# Patient Record
Sex: MALE | Race: WHITE | NOT HISPANIC OR LATINO | Employment: FULL TIME | URBAN - METROPOLITAN AREA
[De-identification: names, ages, dates, MRNs, and addresses within clinical notes are randomized per-mention and may not be internally consistent; named-entity substitution may affect disease eponyms.]

---

## 2020-11-04 ENCOUNTER — OFFICE VISIT (OUTPATIENT)
Dept: FAMILY MEDICINE | Facility: CLINIC | Age: 60
End: 2020-11-04
Payer: COMMERCIAL

## 2020-11-04 VITALS
WEIGHT: 217 LBS | HEIGHT: 68 IN | OXYGEN SATURATION: 99 % | SYSTOLIC BLOOD PRESSURE: 150 MMHG | RESPIRATION RATE: 18 BRPM | DIASTOLIC BLOOD PRESSURE: 80 MMHG | TEMPERATURE: 98.5 F | BODY MASS INDEX: 32.89 KG/M2 | HEART RATE: 89 BPM

## 2020-11-04 DIAGNOSIS — I10 HTN, GOAL BELOW 140/90: Primary | ICD-10-CM

## 2020-11-04 LAB
ANION GAP SERPL CALCULATED.3IONS-SCNC: 3 MMOL/L (ref 3–14)
BUN SERPL-MCNC: 21 MG/DL (ref 7–30)
CALCIUM SERPL-MCNC: 9.1 MG/DL (ref 8.5–10.1)
CHLORIDE SERPL-SCNC: 105 MMOL/L (ref 94–109)
CO2 SERPL-SCNC: 31 MMOL/L (ref 20–32)
CREAT SERPL-MCNC: 1.36 MG/DL (ref 0.66–1.25)
GFR SERPL CREATININE-BSD FRML MDRD: 57 ML/MIN/{1.73_M2}
GLUCOSE SERPL-MCNC: 103 MG/DL (ref 70–99)
POTASSIUM SERPL-SCNC: 4.1 MMOL/L (ref 3.4–5.3)
SODIUM SERPL-SCNC: 139 MMOL/L (ref 133–144)

## 2020-11-04 PROCEDURE — 36415 COLL VENOUS BLD VENIPUNCTURE: CPT | Performed by: NURSE PRACTITIONER

## 2020-11-04 PROCEDURE — 99203 OFFICE O/P NEW LOW 30 MIN: CPT | Performed by: NURSE PRACTITIONER

## 2020-11-04 PROCEDURE — 80048 BASIC METABOLIC PNL TOTAL CA: CPT | Performed by: NURSE PRACTITIONER

## 2020-11-04 RX ORDER — LISINOPRIL AND HYDROCHLOROTHIAZIDE 20; 25 MG/1; MG/1
1 TABLET ORAL DAILY
Qty: 90 TABLET | Refills: 1 | Status: SHIPPED | OUTPATIENT
Start: 2020-11-04 | End: 2021-01-06

## 2020-11-04 RX ORDER — AMLODIPINE BESYLATE 10 MG/1
10 TABLET ORAL DAILY
COMMUNITY
Start: 2020-05-21 | End: 2020-11-04

## 2020-11-04 RX ORDER — AMLODIPINE BESYLATE 10 MG/1
10 TABLET ORAL DAILY
Qty: 90 TABLET | Refills: 1 | Status: SHIPPED | OUTPATIENT
Start: 2020-11-04 | End: 2021-03-22

## 2020-11-04 RX ORDER — LISINOPRIL AND HYDROCHLOROTHIAZIDE 20; 25 MG/1; MG/1
1 TABLET ORAL DAILY
COMMUNITY
Start: 2020-05-21 | End: 2020-11-04

## 2020-11-04 ASSESSMENT — PAIN SCALES - GENERAL: PAINLEVEL: NO PAIN (0)

## 2020-11-04 ASSESSMENT — MIFFLIN-ST. JEOR: SCORE: 1778.81

## 2020-11-04 NOTE — PROGRESS NOTES
"Subjective     Josiah Wolf is a 60 year old male who presents to clinic today for the following health issues:    HPI         Hypertension Follow-up      Do you check your blood pressure regularly outside of the clinic? Yes     Are you following a low salt diet? Yes    Are your blood pressures ever more than 140 on the top number (systolic) OR more   than 90 on the bottom number (diastolic), for example 140/90? Yes      How many servings of fruits and vegetables do you eat daily?  4 or more    On average, how many sweetened beverages do you drink each day (Examples: soda, juice, sweet tea, etc.  Do NOT count diet or artificially sweetened beverages)?   0    How many days per week do you exercise enough to make your heart beat faster? 3 or less    How many minutes a day do you exercise enough to make your heart beat faster? 9 or less    How many days per week do you miss taking your medication? Has been out of medication    Didn't take BP medications this morning - out x3 days  No chest pain, shortness of breath, edema, palpitations, headaches    Review of Systems   Constitutional, HEENT, cardiovascular, pulmonary, gi and gu systems are negative, except as otherwise noted.      Objective    BP (!) 150/80   Pulse 89   Temp 98.5  F (36.9  C) (Oral)   Resp 18   Ht 1.727 m (5' 8\")   Wt 98.4 kg (217 lb)   SpO2 99%   BMI 32.99 kg/m    Body mass index is 32.99 kg/m .  Physical Exam   GENERAL: healthy, alert and no distress  RESP: lungs clear to auscultation - no rales, rhonchi or wheezes  CV: regular rate and rhythm, normal S1 S2, no S3 or S4, no murmur, click or rub, no peripheral edema and peripheral pulses strong  PSYCH: mentation appears normal, affect normal/bright    Results for orders placed or performed in visit on 11/04/20   Basic metabolic panel  (Ca, Cl, CO2, Creat, Gluc, K, Na, BUN)     Status: Abnormal   Result Value Ref Range    Sodium 139 133 - 144 mmol/L    Potassium 4.1 3.4 - 5.3 mmol/L    Chloride " "105 94 - 109 mmol/L    Carbon Dioxide 31 20 - 32 mmol/L    Anion Gap 3 3 - 14 mmol/L    Glucose 103 (H) 70 - 99 mg/dL    Urea Nitrogen 21 7 - 30 mg/dL    Creatinine 1.36 (H) 0.66 - 1.25 mg/dL    GFR Estimate 57 (L) >60 mL/min/[1.73_m2]    GFR Estimate If Black 66 >60 mL/min/[1.73_m2]    Calcium 9.1 8.5 - 10.1 mg/dL           Assessment & Plan     HTN, goal below 140/90  Uncontrolled but out of medication x3 days. Refilled.   - amLODIPine (NORVASC) 10 MG tablet; Take 1 tablet (10 mg) by mouth daily  - lisinopril-hydrochlorothiazide (ZESTORETIC) 20-25 MG tablet; Take 1 tablet by mouth daily  - Basic metabolic panel  (Ca, Cl, CO2, Creat, Gluc, K, Na, BUN)     BMI:   Estimated body mass index is 32.99 kg/m  as calculated from the following:    Height as of this encounter: 1.727 m (5' 8\").    Weight as of this encounter: 98.4 kg (217 lb).   Weight management plan: Discussed healthy diet and exercise guidelines         See Patient Instructions    Return in about 6 months (around 5/4/2021).     The benefits, risks and potential side effects were discussed in detail. Black box warnings discussed as relevant. All patient questions were answered. The patient was instructed to follow up immediately if any adverse reactions develop.    Return precautions discussed, including when to seek urgent/emergent care.    Patient verbalizes understanding and agrees with plan of care. Patient stable for discharge.      STEPHANIE Rsamussen [facility]    This visit took place during the COVID 19 global pandemic.   PPE worn during the visit included: surgical mask and face shield by me and mask by patient       "

## 2020-11-04 NOTE — LETTER
November 6, 2020      Josiah Wolf  [address]  [place] [facility] [address]        Dear ,    Your lab results are attached. Your kidney function is a bit decreased. Be sure to stay well hydrated by drinking at least 64 oz water daily and avoid NSAIDs such as ibuprofen, naproxen, Aleve, and Advil. I recommend follow up in 3 months to recheck kidney function with a BMP and urine microalbumin test. Please let us know if you have any questions.     Thank you for allowing us to participate in your care.       STEPHANIE Rasmussen CNP       Results for orders placed or performed in visit on 11/04/20   Basic metabolic panel  (Ca, Cl, CO2, Creat, Gluc, K, Na, BUN)     Status: Abnormal   Result Value Ref Range    Sodium 139 133 - 144 mmol/L    Potassium 4.1 3.4 - 5.3 mmol/L    Chloride 105 94 - 109 mmol/L    Carbon Dioxide 31 20 - 32 mmol/L    Anion Gap 3 3 - 14 mmol/L    Glucose 103 (H) 70 - 99 mg/dL    Urea Nitrogen 21 7 - 30 mg/dL    Creatinine 1.36 (H) 0.66 - 1.25 mg/dL    GFR Estimate 57 (L) >60 mL/min/[1.73_m2]    GFR Estimate If Black 66 >60 mL/min/[1.73_m2]    Calcium 9.1 8.5 - 10.1 mg/dL

## 2020-11-06 NOTE — RESULT ENCOUNTER NOTE
Please send letter:    Josiah,  Your lab results are attached. Your kidney function is a bit decreased. Be sure to stay well hydrated by drinking at least 64 oz water daily and avoid NSAIDs such as ibuprofen, naproxen, Aleve, and Advil. I recommend follow up in 3 months to recheck kidney function with a BMP and urine microalbumin test. Please let us know if you have any questions.  Thank you for allowing us to participate in your care.  STEPHANIE Rasmussen CNP

## 2020-12-13 ENCOUNTER — TRANSFERRED RECORDS (OUTPATIENT)
Dept: HEALTH INFORMATION MANAGEMENT | Facility: CLINIC | Age: 60
End: 2020-12-13

## 2020-12-13 ENCOUNTER — NURSE TRIAGE (OUTPATIENT)
Dept: NURSING | Facility: CLINIC | Age: 60
End: 2020-12-13

## 2021-01-06 ENCOUNTER — OFFICE VISIT (OUTPATIENT)
Dept: FAMILY MEDICINE | Facility: CLINIC | Age: 61
End: 2021-01-06
Payer: COMMERCIAL

## 2021-01-06 VITALS
SYSTOLIC BLOOD PRESSURE: 148 MMHG | WEIGHT: 208 LBS | OXYGEN SATURATION: 97 % | BODY MASS INDEX: 30.81 KG/M2 | HEART RATE: 100 BPM | DIASTOLIC BLOOD PRESSURE: 81 MMHG | HEIGHT: 69 IN | TEMPERATURE: 98 F

## 2021-01-06 DIAGNOSIS — R73.09 ELEVATED HEMOGLOBIN A1C: ICD-10-CM

## 2021-01-06 DIAGNOSIS — U07.1 PNEUMONIA DUE TO COVID-19 VIRUS: ICD-10-CM

## 2021-01-06 DIAGNOSIS — J12.82 PNEUMONIA DUE TO COVID-19 VIRUS: ICD-10-CM

## 2021-01-06 DIAGNOSIS — J96.01 ACUTE RESPIRATORY FAILURE WITH HYPOXIA (H): ICD-10-CM

## 2021-01-06 DIAGNOSIS — I10 HTN, GOAL BELOW 140/90: ICD-10-CM

## 2021-01-06 DIAGNOSIS — Z09 HOSPITAL DISCHARGE FOLLOW-UP: Primary | ICD-10-CM

## 2021-01-06 PROCEDURE — 99214 OFFICE O/P EST MOD 30 MIN: CPT | Performed by: NURSE PRACTITIONER

## 2021-01-06 RX ORDER — HYDROCHLOROTHIAZIDE 12.5 MG/1
12.5 TABLET ORAL DAILY
Qty: 30 TABLET | Refills: 1 | Status: SHIPPED | OUTPATIENT
Start: 2021-01-06 | End: 2021-01-25

## 2021-01-06 RX ORDER — GLUCOSAMINE HCL/CHONDROITIN SU 500-400 MG
CAPSULE ORAL
Qty: 100 EACH | Refills: 3 | Status: SHIPPED | OUTPATIENT
Start: 2021-01-06 | End: 2021-11-18

## 2021-01-06 RX ORDER — BENZONATATE 200 MG/1
200 CAPSULE ORAL 3 TIMES DAILY PRN
Qty: 30 CAPSULE | Refills: 0 | Status: SHIPPED | OUTPATIENT
Start: 2021-01-06 | End: 2021-11-18

## 2021-01-06 RX ORDER — LANCETS
EACH MISCELLANEOUS
Qty: 100 EACH | Refills: 6 | Status: SHIPPED | OUTPATIENT
Start: 2021-01-06 | End: 2021-11-18

## 2021-01-06 ASSESSMENT — MIFFLIN-ST. JEOR: SCORE: 1749.11

## 2021-01-06 NOTE — PROGRESS NOTES
Assessment & Plan     Hospital discharge follow-up      Acute respiratory failure with hypoxia (H)  Resolved.    Pneumonia due to COVID-19 virus  Mostly resolved but continues with occasional dry cough.  - benzonatate (TESSALON) 200 MG capsule; Take 1 capsule (200 mg) by mouth 3 times daily as needed for cough    Elevated hemoglobin A1c  Monitor BG 3-5 times per week. Recheck in 2 months. Likely from stress of COVID In hospital.   - blood glucose monitoring (NO BRAND SPECIFIED) meter device kit; Use to test blood sugar 1 times daily or as directed. Preferred blood glucose meter OR supplies to accompany: Blood Glucose Monitor Brands: per insurance.  - blood glucose (NO BRAND SPECIFIED) test strip; Use to test blood sugar 1 times daily or as directed. To accompany: Blood Glucose Monitor Brands: per insurance.  - blood glucose calibration (NO BRAND SPECIFIED) solution; To accompany: Blood Glucose Monitor Brands: per insurance.  - thin (NO BRAND SPECIFIED) lancets; Use with lanceting device. To accompany: Blood Glucose Monitor Brands: per insurance.  - alcohol swab prep pads; Use to swab area of injection/jose as directed.    HTN, goal below 140/90  Not at goal with amlodipine alone. Adding diuretic back. He will continue to monitor and notify us if >140/90.  - hydrochlorothiazide (HYDRODIURIL) 12.5 MG tablet; Take 1 tablet (12.5 mg) by mouth daily  - **Basic metabolic panel FUTURE 14d; Future          See Patient Instructions    Return in about 3 months (around 4/6/2021).     The benefits, risks and potential side effects were discussed in detail. Black box warnings discussed as relevant. All patient questions were answered. The patient was instructed to follow up immediately if any adverse reactions develop.    Return precautions discussed, including when to seek urgent/emergent care.    Patient verbalizes understanding and agrees with plan of care. Patient stable for discharge.      STEPHANIE Rasmussen CNP  [facility]    This visit took place during the COVID 19 global pandemic.   PPE worn during the visit included: surgical mask and face shield by me and mask by patient       Subjective     Josiah Wolf is a 60 year old who presents to clinic today for the following health issues     HPI         Hospital Follow-up Visit:    Hospital/Nursing Home/IP Rehab Facility: [facility]  Date of Admission: 12/13/2020  Date of Discharge: 12/21/2020  Reason(s) for Admission: Pneumonia due to COVID-19 virus      Was your hospitalization related to COVID-19? YES   How are you feeling today? fine  In the past 24 hours have you had shortness of breath when speaking, walking, or climbing stairs? I don't have breathing problems fatigue in the morning  Do you have a cough? Yes, I have a cough but it's not worse  When is the last time you had a fever greater than 100? NA  Are you having any other symptoms? None   Do you have any other stressors you would like to discuss with your provider? OTHER: Glucose level       Was the patient in the ICU or did the patient experience delirium during hospitalization?  No    Problems taking medications regularly:  None  Medication changes since discharge: None  Problems adhering to non-medication therapy:  None    Summary of hospitalization:  CareEverywhere information obtained and reviewed  Diagnostic Tests/Treatments reviewed.  Follow up needed: none  Other Healthcare Providers Involved in Patient s Care:         None  Update since discharge: improved. Post Discharge Medication Reconciliation: discharge medications reconciled, continue medications without change.  Plan of care communicated with patient          140/82 this AM  On amlodipine only  Prior to hospitalization was also on lisinopril-hydrochlorothiazide 20-25  hospitalist wanted him to see if he needed other agent after discharge      Review of Systems   Constitutional, HEENT, cardiovascular, pulmonary,  "GI, , musculoskeletal, neuro, skin, endocrine and psych systems are negative, except as otherwise noted.      Objective    BP (!) 148/81   Pulse 100   Temp 98  F (36.7  C)   Ht 1.745 m (5' 8.7\")   Wt 94.3 kg (208 lb)   SpO2 97%   BMI 30.98 kg/m    Body mass index is 30.98 kg/m .  Physical Exam   GENERAL: healthy, alert and no distress  EYES: Eyes grossly normal to inspection, PERRL and conjunctivae and sclerae normal  HENT: ear canals and TM's normal, nose and mouth without ulcers or lesions  NECK: no adenopathy, no asymmetry, masses, or scars and thyroid normal to palpation  RESP: lungs clear to auscultation - no rales, rhonchi or wheezes  CV: regular rate and rhythm, normal S1 S2, no S3 or S4, no murmur, click or rub, no peripheral edema and peripheral pulses strong  MS: no gross musculoskeletal defects noted, no edema  PSYCH: mentation appears normal, affect normal/bright    No results found for any visits on 01/06/21.            "

## 2021-01-06 NOTE — PATIENT INSTRUCTIONS
Benzonatate as needed for cough  Check your blood pressure daily  Check your blood sugar 3 times per week before breakfast  Lab only visit in 2 weeks  Follow up in clinic in 3 months, sooner if frequent urination, dry mouth, very thirsty, etc  Patient Education    At [facility], we strive to deliver an exceptional experience to you, every time we see you. If you receive a survey, please complete it as we do value your feedback.  If you have MyChart, you can expect to receive results automatically within 24 hours of their completion.  Your provider will send a note interpreting your results as well.   If you do not have MyChart, you should receive your results in about a week by mail.    Your care team:                            Family Medicine Internal Medicine   MD Braden Reyna, MD Marva Fowler, MD Wilbur Marshall, MD Aileen Desai PA-C  Leta Miles, APRN CNP    Allan Rodriguez, MD Pediatrics   Low Ellis, PA-C  Geneva Powers, CNP Alondra Vaughn, MD Cathleen Gaston APRN CNP   MD Shavon Daniel MD Deborah Mielke, MD Kim Thein, APRN CNP  Freda Zamudio, PA-C  Minnie Moncada, CNP  MD Tisha Nettles MD Angela Wermerskirchen, MD      Clinic hours: Monday - Thursday 7 am-7 pm; Fridays 7 am-5 pm.   Urgent care: Monday - Friday 11 am-9 pm; Saturday and Sunday 9 am-5 pm.    Clinic: [phone]       [facility]: Monday - Thursday 8 am - 7 pm; Friday 8 am - 6 pm  [facility]: [phone]     Use www.oncare.org for 24/7 diagnosis and treatment of dozens of conditions.      Patient Education     Established High Blood Pressure    High blood pressure (hypertension) is a long-term (chronic) disease. Often healthcare providers don t know what causes it. But it can be caused by certain health conditions and medicines.  If you have high blood pressure, you may not  have any symptoms. If you do have symptoms, they may include:    Headache    Dizziness    Changes in your vision    Chest pain    Shortness of breath  But even without symptoms, high blood pressure that s not treated raises your risk for heart attack, heart failure, kidney disease, and stroke. High blood pressure is a serious health risk and shouldn t be ignored.  Blood pressure measurements are given as 2 numbers. Systolic blood pressure is the upper number. This is the pressure when the heart contracts. Diastolic blood pressure is the lower number. This is the pressure when the heart relaxes between beats. You will see your blood pressure readings written together. For example, a person with a systolic pressure of 118 and a diastolic pressure of 78 will have 118/78 written in the medical record.  Blood pressure is classified as normal, raised (elevated) or stage 1 or stage 2 high blood pressure:    Normal blood pressure. Systolic of less than 120 and diastolic of less than 80 (120/80).    Elevated blood pressure. Systolic of 120 to 129 and diastolic less than 80.    Stage 1 high blood pressure. Systolic is 130 to 139 or diastolic between 80 to 89.    Stage 2 high blood pressure. Systolic is 140 or higher or the diastolic is 90 or higher.  Home care  If you have high blood pressure, follow these home care guidelines to help lower your blood pressure. If you are taking medicines for high blood pressure, these methods may reduce or end your need for medicines in the future.    Start a weight-loss program if you are overweight.    Cut back on how much salt you get in your diet. Here s how to do this:  ? Don t eat foods that have a lot of salt. These include olives, pickles, smoked meats, and salted potato chips.  ? Don t add salt to your food at the table.  ? Use only small amounts of salt when cooking.    Start an exercise program. Talk with your healthcare provider about the type of exercise program that would be  best for you. It doesn't have to be hard. Even brisk walking for 20 minutes 3 times a week is a good form of exercise.    Don t take medicines that stimulate the heart. This includes many over-the-counter cold and sinus decongestant pills and sprays, as well as diet pills. Check the warnings about high blood pressure on the label. Before buying any over-the-counter medicines or supplements, always ask the pharmacist about the product's possible interaction with your high blood pressure and your high blood pressure medicines.    Stimulants such as amphetamine or cocaine could be deadly for someone with high blood pressure. Never take these.    Limit how much caffeine you get in your diet. Switch to caffeine-free products.    Stop smoking. If you are a long-time smoker, this can be hard. Talk with your healthcare provider about medicines and nicotine replacement options to help you. Also join a stop-smoking program . This makes it more likely that you will quit for good.    Learn how to handle stress. This is an important part of any program to lower blood pressure. Learn about relaxation methods such as meditation, yoga, or biofeedback.    If your provider prescribed medicines, take them exactly as directed. Missing doses may cause your blood pressure get out of control.    If you miss a dose, check with your healthcare provider or pharmacist about what to do.    Think about buying an automatic blood pressure machine to check your blood pressure at home. Ask your provider for a recommendation. You can get one of these at most pharmacies.  Using a home blood pressure monitor  The American Heart Association advises the following guidelines for home blood pressure monitoring:    Don't smoke or drink coffee for 30 minutes before taking your blood pressure.    Go to the bathroom before the test.    Relax for 5 minutes before taking the measurement.    Sit with your back supported (don't sit on a couch or soft chair). Keep  your feet on the floor uncrossed. Place your arm on a solid flat surface (such as a table) with the upper part of the arm at heart level. Place the middle of the cuff directly above the bend of the elbow. Check the monitor's instruction manual for an illustration.    Take multiple readings. When you measure, take 2 to 3 readings one minute apart and record all of the results.    Take your blood pressure at the same time every day, or as your healthcare provider advises.    Record the date, time, and blood pressure reading.    Take the record with you to your next healthcare appointment. If your blood pressure monitor has a built-in memory, just take the monitor with you to your next appointment.    Call your provider if you have several high readings. Don't be frightened by one high blood pressure reading. But if you get a few high readings, check in with your healthcare provider.  Follow-up care  You will need to see your healthcare provider regularly. This is to check your blood pressure and to make changes to your medicines. Make a follow-up appointment as directed. Bring the record of your home blood pressure readings to the appointment.  Call 911  [id number] you have any of these:    Blood pressure of 180/120 or higher    Chest pain or shortness of breath    Weakness of an arm or leg or one side of the face    Problems speaking or seeing    When to get medical advice  Call your healthcare provider right away if any of these occur:    Severe headache    Throbbing or rushing sound in the ears    Nosebleed    Sudden severe pain in your belly (abdomen)    Extreme drowsiness, confusion, or fainting    Dizziness or spinning feeling (vertigo)  [employer] last reviewed this educational content on 7/1/2019 2000-2020 [employer]. [address] [place] [address], [place] [address]. All rights reserved. This information is not intended as a substitute for professional medical care. Always follow your healthcare  "professional's instructions.           Patient Education     High Blood Pressure and Chronic Kidney Disease (CKD)  What is high blood pressure?  For CKD patients, a normal blood pressure is 120/80 (or \"120 over 80\"). When blood pressure stays at 130/80 or higher, it is called high blood pressure (hypertension).  How are kidney disease and high blood pressure related?    More than half of the patients who have chronic kidney disease also have high blood pressure.    High blood pressure increases the chance that kidney disease will get worse.    High blood pressure is a major cause of CKD.   Damaged blood vessels send less blood to the important organs in your body, including your kidneys. Your kidneys filter waste from your blood. When your kidneys can't clean your blood as well as they should, this waste builds up in your body.    CKD can also cause high blood pressure. Your kidneys help keep your blood pressure in a healthy range. Controlling your blood pressure will keep your kidneys from getting worse. This will make CKD easier for you and your doctor to manage.  How do I treat high blood pressure and CKD?    Your doctor will give you blood pressure goals to meet and show you how to check your blood pressure at home.  ? It is important that you check your pressure as directed. High blood pressure often has no symptoms.  ? Make sure to write down the date, time and result of your readings.  ? If you take blood pressure medicine, take it before you measure blood pressure. This helps us know if your medicine is working the way it should.    Stop smoking.    Follow your diet and exercise plan.    Take all medicines as directed.  ? If you have kidney disease from diabetes or if you have protein in your urine, the best blood pressure medicines for your treatment are angiotensin converting enzyme (ACE) inhibitors or angiotensin receptor blockers (ARB).  ? If you have any side effects from your blood pressure medicine, " tell your doctor. He or she may switch you to a different medicine.  How often should I see my doctor?    Your CKD team will outline a treatment plan for you after you are diagnosed. Most patients come to the clinic 1 or 2 times per year. We'll ask you to come in more often if:  ? You start a new medicine or we change your medicine dose.  ? Your kidney function is getting worse.  ? Your blood pressure is not controlled.    At each visit, we will test your blood and urine and measure your blood pressure. (Remember to check your blood pressure at home to help guide your treatment.)    DON'T be afraid to ask questions. We are here to help you.  How to check your blood pressure at home:  1. Sit in a quiet area.  2. Remove thick clothing covering the arm.  3. Sit up straight with feet flat on the ground and legs uncrossed.  4. Wrap the cuff around your upper arm above the elbow.  5. Rest the arm you are testing on a steady surface  6. Try to relax for 5 to 10 minutes before testing your blood pressure. This will make the reading more accurate.  7. Record your blood pressure, heart rate, day and time in a blood pressure log.  8. It is best to check your blood pressure around the same time every day--about an hour after taking your medicine.  NOTE: If at any point you are having trouble taking your blood pressure, please call the clinic. One of the nurses will be happy to meet with you. The nurse will review the steps and make sure your blood pressure cuff is working.  Things to avoid when checking blood pressure    Do not check your blood pressure right after waking up in the morning--wait at least an hour.    Do not check your blood pressure after exercise or heavy activity.    Avoid food, caffeine, alcohol and tobacco 30 minutes before testing.    Do not measure your blood pressure with a full bladder.    Avoid talking while checking your blood pressure  For more information  National Kidney Foundation    www.kidney.org  4-691-005-3503  Other resources  National Kidney Foundation   www.kidney.org  0-009-481-9225  For informational purposes only. Not to replace the advice of your health care provider.   Copyright   2016 [employer]. All rights reserved. [employer] 455788 - Rev 02/18.  For informational purposes only. Not to replace the advice of your health care provider.  Copyright   2018 [employer]. All rights reserved.

## 2021-01-20 DIAGNOSIS — I10 HTN, GOAL BELOW 140/90: ICD-10-CM

## 2021-01-20 LAB
ANION GAP SERPL CALCULATED.3IONS-SCNC: 6 MMOL/L (ref 3–14)
BUN SERPL-MCNC: 17 MG/DL (ref 7–30)
CALCIUM SERPL-MCNC: 9 MG/DL (ref 8.5–10.1)
CHLORIDE SERPL-SCNC: 104 MMOL/L (ref 94–109)
CO2 SERPL-SCNC: 30 MMOL/L (ref 20–32)
CREAT SERPL-MCNC: 1.35 MG/DL (ref 0.66–1.25)
GFR SERPL CREATININE-BSD FRML MDRD: 57 ML/MIN/{1.73_M2}
GLUCOSE SERPL-MCNC: 106 MG/DL (ref 70–99)
POTASSIUM SERPL-SCNC: 3.5 MMOL/L (ref 3.4–5.3)
SODIUM SERPL-SCNC: 140 MMOL/L (ref 133–144)

## 2021-01-20 PROCEDURE — 36415 COLL VENOUS BLD VENIPUNCTURE: CPT | Performed by: NURSE PRACTITIONER

## 2021-01-20 PROCEDURE — 80048 BASIC METABOLIC PNL TOTAL CA: CPT | Performed by: NURSE PRACTITIONER

## 2021-01-20 NOTE — LETTER
January 25, 2021      Josiah Wolf  [address]  [place] [facility] [address]        Dear ,    We discussed these results on the phone. Your lab results were at your baseline. Please continue the amlodipine 10 mg and lisinopril 10 mg and follow up in clinic in 2 months. Please let us know if you have any questions.     Thank you for allowing us to participate in your care.       STEPHANIE Rasmussen CNP     Resulted Orders   **Basic metabolic panel FUTURE 14d   Result Value Ref Range    Sodium 140 133 - 144 mmol/L    Potassium 3.5 3.4 - 5.3 mmol/L    Chloride 104 94 - 109 mmol/L    Carbon Dioxide 30 20 - 32 mmol/L    Anion Gap 6 3 - 14 mmol/L    Glucose 106 (H) 70 - 99 mg/dL    Urea Nitrogen 17 7 - 30 mg/dL    Creatinine 1.35 (H) 0.66 - 1.25 mg/dL    GFR Estimate 57 (L) >60 mL/min/[1.73_m2]      Comment:      Non  GFR Calc  Starting 12/18/2018, serum creatinine based estimated GFR (eGFR) will be   calculated using the Chronic Kidney Disease Epidemiology Collaboration   (CKD-EPI) equation.      GFR Estimate If Black 66 >60 mL/min/[1.73_m2]      Comment:       GFR Calc  Starting 12/18/2018, serum creatinine based estimated GFR (eGFR) will be   calculated using the Chronic Kidney Disease Epidemiology Collaboration   (CKD-EPI) equation.      Calcium 9.0 8.5 - 10.1 mg/dL

## 2021-01-25 ENCOUNTER — TELEPHONE (OUTPATIENT)
Dept: FAMILY MEDICINE | Facility: CLINIC | Age: 61
End: 2021-01-25

## 2021-01-25 DIAGNOSIS — I10 HTN, GOAL BELOW 140/90: Primary | ICD-10-CM

## 2021-01-25 RX ORDER — LISINOPRIL 10 MG/1
10 TABLET ORAL DAILY
Qty: 90 TABLET | Refills: 1 | Status: SHIPPED | OUTPATIENT
Start: 2021-01-25 | End: 2021-03-22

## 2021-01-25 NOTE — RESULT ENCOUNTER NOTE
Please send letter:    Mr. Wolf,  We discussed these results on the phone. Your lab results were at your baseline. Please continue the amlodipine 10 mg and lisinopril 10 mg and follow up in clinic in 2 months. Please let us know if you have any questions.  Thank you for allowing us to participate in your care.  STEPHANIE Rasmussen CNP

## 2021-01-25 NOTE — TELEPHONE ENCOUNTER
Discussed labs with patient.   Will add lisinopril back for BP/kidney protection especially in light of recently elevated a1c (thought to be from COVID/dexamethasone in the hospital)  and have him hold hydrochlorothiazide for now. He's still not at pre-hospitalization dosing (lisinopril-hydrochlorothiazide 20-25 + amlodipine 10 mg)  States he's feeling great  BP this /80  New regimen will be amlodipine 10 mg and lisinopril 10 mg  In 2 months at next visit he will need BMP, a1c and urine microalbumin

## 2021-03-22 ENCOUNTER — VIRTUAL VISIT (OUTPATIENT)
Dept: FAMILY MEDICINE | Facility: CLINIC | Age: 61
End: 2021-03-22
Payer: COMMERCIAL

## 2021-03-22 DIAGNOSIS — I10 HTN, GOAL BELOW 140/90: Primary | ICD-10-CM

## 2021-03-22 DIAGNOSIS — R73.09 ELEVATED HEMOGLOBIN A1C: ICD-10-CM

## 2021-03-22 PROCEDURE — 99214 OFFICE O/P EST MOD 30 MIN: CPT | Mod: TEL | Performed by: NURSE PRACTITIONER

## 2021-03-22 RX ORDER — AMLODIPINE BESYLATE 10 MG/1
10 TABLET ORAL DAILY
Qty: 90 TABLET | Refills: 1 | Status: SHIPPED | OUTPATIENT
Start: 2021-03-22 | End: 2021-11-18

## 2021-03-22 RX ORDER — LISINOPRIL 10 MG/1
10 TABLET ORAL DAILY
Qty: 90 TABLET | Refills: 1 | Status: SHIPPED | OUTPATIENT
Start: 2021-03-22 | End: 2021-11-22

## 2021-03-22 NOTE — PATIENT INSTRUCTIONS
Schedule lab only visit (no need to be fasting)  Sign up for [employer] (instructions in this packet) and send me your BP in a couple of weeks  We may need to increase BP medication based on these readings  Patient Education     Controlling High Blood Pressure  High blood pressure (hypertension) is often called the silent killer. This is because many people who have it, don t know it. It can be very dangerous. High blood pressure can raise your risk of heart attack, stroke, heart disease, and heart failure. Controlling your blood pressure can decrease your risk of these problems. It's important to know the appropriate blood pressure range and remember to check your blood pressure regularly. Doing so can save your life.  Blood pressure measurements are given as 2 numbers. Systolic blood pressure is the upper number. This is the pressure when the heart contracts. Diastolic blood pressure is the lower number. This is the pressure when the heart relaxes between beats.  Blood pressure is categorized as normal, elevated, or stage 1 or stage 2 high blood pressure:    Normal blood pressure is systolic of less than 120 and diastolic of less than 80 (120/80)    Elevated blood pressure is systolic of 120 to 129 and diastolic less than 80    Stage 1 high blood pressure is systolic of 130 to 139 or diastolic between 80 to 89    Stage 2 high blood pressure is when systolic is 140 or higher or the diastolic is 90 or higher  A heart-healthy lifestyle can help you control your blood pressure without medicines. Here are some things you can do to pursue a heart-healthy lifestyle:    Choose heart-healthy foods    Select low-salt, low-fat foods. Limit sodium intake to 2,400 mg per day or the amount suggested by your healthcare provider.    Limit canned, dried, cured, packaged, and fast foods. These can contain a lot of salt.    Eat 8 to 10 servings of fruits and vegetables every day.    Choose lean meats, fish, or chicken.    Eat  whole-grain pasta, brown rice, and beans.    Eat 2 to 3 servings of low-fat or fat-free dairy products.    Ask your doctor about the DASH eating plan. This plan helps reduce blood pressure.    When you go to a restaurant, ask that your meal be prepared with no added salt.    Stay at a healthy weight    Ask your healthcare provider how many calories to eat a day. Then stick to that number.    Ask your healthcare provider what weight range is healthiest for you. If you are overweight, a weight loss of only 3% to 5% of your body weight can help lower blood pressure. Generally, a good weight loss goal is to lose 10% of your body weight in a year.    Limit snacks and sweets.    Get regular exercise.    Get up and get active    Find activities you enjoy that can be done alone or with friends or family. Such activities might include bicycling, dancing, walking, or jogging.    Park farther away from building entrances to walk more.    Use stairs instead of the elevator.    When you can, walk or bike instead of driving.    [place] leaves, garden, or do household repairs.    Be active at a moderate to vigorous level of physical activity for at least 40 minutes for a minimum of 3 to 4 days a week.     Manage stress    Make time to relax and enjoy life. Find time to laugh.    Communicate your concerns with your loved ones and your healthcare provider.    Visit with family and friends, and keep up with hobbies.    Limit alcohol and quit smoking    Men should have no more than 2 drinks per day.    Women should have no more than 1 drink per day.    Talk with your healthcare provider about quitting smoking. Smoking significantly increases your risk for heart disease and stroke. Ask your healthcare provider about community smoking cessation programs and other options.    Medicines  If lifestyle changes aren t enough, your healthcare provider may prescribe high blood pressure medicine. Take all medicines as prescribed. If you have any  questions about your medicines, ask your healthcare provider before stopping or changing them.  [employer] last reviewed this educational content on 6/1/2019 2000-2020 [employer]. All rights reserved. This information is not intended as a substitute for professional medical care. Always follow your healthcare professional's instructions.

## 2021-03-22 NOTE — PROGRESS NOTES
Josiah is a 60 year old who is being evaluated via a billable telephone visit.      What phone number would you like to be contacted at? See demographics  How would you like to obtain your AVS? Mail a copy    Assessment & Plan     HTN, goal below 140/90  Was only taking lisinopril. Adding amlodipine back in. Was on hydrochlorothiazide as well but held in the hospital d/t dehydration/COVID symptoms. May need to add back in depending on numbers he sends via [email or web address] in couple of weeks.   - amLODIPine (NORVASC) 10 MG tablet; Take 1 tablet (10 mg) by mouth daily  - lisinopril (ZESTRIL) 10 MG tablet; Take 1 tablet (10 mg) by mouth daily    Elevated hemoglobin A1c  Rechecking with lab only visit.           See Patient Instructions    Schedule lab only visit (no need to be fasting)  Sign up for [email or web address] (instructions in this packet) and send me your BP in a couple of weeks  We may need to increase BP medication based on these readings    Return in about 3 months (around 6/22/2021).     The benefits, risks and potential side effects were discussed in detail. Black box warnings discussed as relevant. All patient questions were answered. The patient was instructed to follow up immediately if any adverse reactions develop.    Return precautions discussed, including when to seek urgent/emergent care.    Patient verbalizes understanding and agrees with plan of care. Patient stable for discharge.      STEPHANIE Rasmussen CNP  [facility]    Oneyda Rahman is a 60 year old who presents for the following health issues     HPI       Pleasant 60 year old male initiated a virtual visit to discuss BP.   BP at home - 165/90  His BP medications were switched around in Dec 2020 due to COVID infection which required hospitalization/ICU stay  Still feeling a little tired and achy at times in joints from COVID  Taking lisinopril 10 mg only (hasn't been taking amlodipine)  No chest pain, shortness of breath, edema,  palpitations, headaches, lightheadedness    Had some high BG in the hospital and a1c elevated. Hospitalist felt it was d/t dexamethasone for COVID treatment.   He has been checking BG at home: -127 - before breakfast  No polyuria, polydipsia or polyphagia    Review of Systems   Constitutional, HEENT, cardiovascular, pulmonary, gi and gu systems are negative, except as otherwise noted.      Objective           Vitals:  No vitals were obtained today due to virtual visit.    Physical Exam   healthy, alert and no distress  PSYCH: Alert and oriented times 3; coherent speech, normal   rate and volume, able to articulate logical thoughts, able   to abstract reason, no tangential thoughts, no hallucinations   or delusions  His affect is normal  RESP: No cough, no audible wheezing, able to talk in full sentences  Remainder of exam unable to be completed due to telephone visits                Phone call duration: 7 minutes

## 2021-11-13 ENCOUNTER — HEALTH MAINTENANCE LETTER (OUTPATIENT)
Age: 61
End: 2021-11-13

## 2021-11-15 NOTE — PROGRESS NOTES
"  Assessment & Plan     HTN, goal below 140/90  Patient reports he's only been taking amlodipine. Prior to having covid Nov 2020 he was also on lisinopril-hydrochlorothiazide but this was stopped in the hospital d/t hypotension/worsening creatinine.   - Albumin Random Urine Quantitative with Creat Ratio  - **Basic metabolic panel FUTURE anytime  - **A1C FUTURE anytime  - amLODIPine (NORVASC) 10 MG tablet; Take 1 tablet (10 mg) by mouth daily  - Adult Nephrology Referral; Future    Elevated hemoglobin A1c  Normalized now. Likely due to steroids from hospitalization for covid.    Screen for colon cancer  - Fecal colorectal cancer screen (FIT); Future  - Fecal colorectal cancer screen (FIT)    Elevated serum creatinine  Referring to nephrology for increasing creatinine/decreasing GFR.   - Adult Nephrology Referral; Future    Chronic kidney disease, stage 3a (H)  Referring to nephrology for increasing creatinine/decreasing GFR.   - Adult Nephrology Referral; Future         BMI:   Estimated body mass index is 33.07 kg/m  as calculated from the following:    Height as of 1/6/21: 1.745 m (5' 8.7\").    Weight as of this encounter: 100.7 kg (222 lb).       See Patient Instructions    Return in about 6 months (around 5/18/2022).     The benefits, risks and potential side effects were discussed in detail. Black box warnings discussed as relevant. All patient questions were answered. The patient was instructed to follow up immediately if any adverse reactions develop.    Return precautions discussed, including when to seek urgent/emergent care.    Patient verbalizes understanding and agrees with plan of care. Patient stable for discharge.        STEPHANIE Rasmussen [facility]    I called his pharmacy. He has only been picking up the amlodipine 10 mg.    Oneyda Rahman is a 61 year old who presents for the following health issues     History of Present Illness       Hypertension: He presents " for follow up of hypertension.  He does check blood pressure  regularly outside of the clinic. Outside blood pressures have been over 140/90. He does not follow a low salt diet.     He eats 0-1 servings of fruits and vegetables daily.He consumes 1 sweetened beverage(s) daily.He exercises with enough effort to increase his heart rate 9 or less minutes per day.  He exercises with enough effort to increase his heart rate 3 or less days per week.   He is taking medications regularly.     Has only been taking 1 BP medication. Isn't sure if it's lisinopril or amlodipine (he is prescribed both)  BP runs 140/78-80 at home  No chest pain, shortness of breath, edema, palpitations.    Baseline creatinine 1.2-1.3. Hasn't seen nephrology. Hospitalized for covid last year and creatinine 1.3-1.4 since then.     Prior to having covid Nov 2020 he was also on lisinopril-hydrochlorothiazide but this was stopped in the hospital d/t hypotension/worsening creatinine. Since then he was restarted on lisinopril but has only been taking amlodipine    Review of Systems   Constitutional, HEENT, cardiovascular, pulmonary, gi and gu systems are negative, except as otherwise noted.      Objective    BP (!) 149/82 (BP Location: Left arm, Patient Position: Sitting, Cuff Size: Adult Large)   Pulse 82   Temp 98.5  F (36.9  C) (Tympanic)   Wt 100.7 kg (222 lb)   SpO2 100%   BMI 33.07 kg/m    Body mass index is 33.07 kg/m .  Physical Exam   GENERAL: healthy, alert and no distress  RESP: lungs clear to auscultation - no rales, rhonchi or wheezes  CV: regular rate and rhythm, normal S1 S2, no S3 or S4, no murmur, click or rub, no peripheral edema and peripheral pulses strong  MS: no gross musculoskeletal defects noted, no edema  PSYCH: mentation appears normal, affect normal/bright    No results found for this or any previous visit (from the past 24 hour(s)).

## 2021-11-15 NOTE — PATIENT INSTRUCTIONS
At [facility], we strive to deliver an exceptional experience to you, every time we see you. If you receive a survey, please complete it as we do value your feedback.  If you have MyChart, you can expect to receive results automatically within 24 hours of their completion.  Your provider will send a note interpreting your results as well.   If you do not have MyChart, you should receive your results in about a week by mail.    Your care team:                            Family Medicine Internal Medicine   MD Braden Reyna MD Shantel Branch-Fleming, MD Srinivasa Vaka, MD Katya Belousova, PADIEGO Miles, APRN CNP    Allan Rodriguez, MD Pediatrics   Low Ellis, PADIEGO Powers, CNP MD Cathleen Velásquez APRN CNP   MD Shavon Daniel MD Deborah Mielke, MD Katiana Snyder, APRN [facility]      Clinic hours: Monday - Thursday 7 am-6 pm; Fridays 7 am-5 pm.   Urgent care: Monday - Friday 10 am- 8 pm; Saturday and Sunday 9 am-5 pm.    Clinic: [phone]       [facility]: Monday - Thursday 8 am - 7 pm; Friday 8 am - 6 pm  [facility]: [phone]     Use [email or web address] for 24/7 diagnosis and treatment of dozens of conditions.

## 2021-11-18 ENCOUNTER — OFFICE VISIT (OUTPATIENT)
Dept: FAMILY MEDICINE | Facility: CLINIC | Age: 61
End: 2021-11-18
Payer: COMMERCIAL

## 2021-11-18 VITALS
DIASTOLIC BLOOD PRESSURE: 82 MMHG | BODY MASS INDEX: 33.07 KG/M2 | TEMPERATURE: 98.5 F | OXYGEN SATURATION: 100 % | SYSTOLIC BLOOD PRESSURE: 149 MMHG | HEART RATE: 82 BPM | WEIGHT: 222 LBS

## 2021-11-18 DIAGNOSIS — R73.09 ELEVATED HEMOGLOBIN A1C: ICD-10-CM

## 2021-11-18 DIAGNOSIS — N18.31 CHRONIC KIDNEY DISEASE, STAGE 3A (H): ICD-10-CM

## 2021-11-18 DIAGNOSIS — R79.89 ELEVATED SERUM CREATININE: ICD-10-CM

## 2021-11-18 DIAGNOSIS — I10 HTN, GOAL BELOW 140/90: Primary | ICD-10-CM

## 2021-11-18 DIAGNOSIS — Z12.11 SCREEN FOR COLON CANCER: ICD-10-CM

## 2021-11-18 LAB
ANION GAP SERPL CALCULATED.3IONS-SCNC: 4 MMOL/L (ref 3–14)
BUN SERPL-MCNC: 17 MG/DL (ref 7–30)
CALCIUM SERPL-MCNC: 9 MG/DL (ref 8.5–10.1)
CHLORIDE BLD-SCNC: 107 MMOL/L (ref 94–109)
CO2 SERPL-SCNC: 28 MMOL/L (ref 20–32)
CREAT SERPL-MCNC: 1.4 MG/DL (ref 0.66–1.25)
CREAT UR-MCNC: 287 MG/DL
GFR SERPL CREATININE-BSD FRML MDRD: 55 ML/MIN/1.73M2
GLUCOSE BLD-MCNC: 125 MG/DL (ref 70–99)
HBA1C MFR BLD: 5.4 % (ref 0–5.6)
MICROALBUMIN UR-MCNC: 295 MG/L
MICROALBUMIN/CREAT UR: 102.79 MG/G CR (ref 0–17)
POTASSIUM BLD-SCNC: 3.7 MMOL/L (ref 3.4–5.3)
SODIUM SERPL-SCNC: 139 MMOL/L (ref 133–144)

## 2021-11-18 PROCEDURE — 82043 UR ALBUMIN QUANTITATIVE: CPT | Performed by: NURSE PRACTITIONER

## 2021-11-18 PROCEDURE — 83036 HEMOGLOBIN GLYCOSYLATED A1C: CPT | Performed by: NURSE PRACTITIONER

## 2021-11-18 PROCEDURE — 99214 OFFICE O/P EST MOD 30 MIN: CPT | Performed by: NURSE PRACTITIONER

## 2021-11-18 PROCEDURE — 80048 BASIC METABOLIC PNL TOTAL CA: CPT | Performed by: NURSE PRACTITIONER

## 2021-11-18 PROCEDURE — 36415 COLL VENOUS BLD VENIPUNCTURE: CPT | Performed by: NURSE PRACTITIONER

## 2021-11-18 RX ORDER — AMLODIPINE BESYLATE 10 MG/1
10 TABLET ORAL DAILY
Qty: 90 TABLET | Refills: 1 | Status: SHIPPED | OUTPATIENT
Start: 2021-11-18 | End: 2022-08-10

## 2021-11-18 RX ORDER — LISINOPRIL 20 MG/1
20 TABLET ORAL DAILY
Status: CANCELLED | OUTPATIENT
Start: 2021-11-18

## 2021-11-22 DIAGNOSIS — R79.89 ELEVATED SERUM CREATININE: ICD-10-CM

## 2021-11-22 DIAGNOSIS — N18.31 CHRONIC KIDNEY DISEASE, STAGE 3A (H): Primary | ICD-10-CM

## 2021-11-24 ENCOUNTER — ANCILLARY PROCEDURE (OUTPATIENT)
Dept: ULTRASOUND IMAGING | Facility: CLINIC | Age: 61
End: 2021-11-24
Attending: NURSE PRACTITIONER
Payer: COMMERCIAL

## 2021-11-24 DIAGNOSIS — N18.31 CHRONIC KIDNEY DISEASE, STAGE 3A (H): ICD-10-CM

## 2021-11-24 DIAGNOSIS — R79.89 ELEVATED SERUM CREATININE: ICD-10-CM

## 2021-11-24 PROCEDURE — 93975 VASCULAR STUDY: CPT | Performed by: RADIOLOGY

## 2021-11-29 DIAGNOSIS — R93.5 ABNORMAL US (ULTRASOUND) OF ABDOMEN: Primary | ICD-10-CM

## 2021-12-01 ENCOUNTER — LAB (OUTPATIENT)
Dept: LAB | Facility: CLINIC | Age: 61
End: 2021-12-01
Payer: COMMERCIAL

## 2021-12-01 DIAGNOSIS — R93.5 ABNORMAL US (ULTRASOUND) OF ABDOMEN: ICD-10-CM

## 2021-12-01 LAB
ALBUMIN SERPL-MCNC: 3.8 G/DL (ref 3.4–5)
ALP SERPL-CCNC: 89 U/L (ref 40–150)
ALT SERPL W P-5'-P-CCNC: 32 U/L (ref 0–70)
AST SERPL W P-5'-P-CCNC: 18 U/L (ref 0–45)
BILIRUB DIRECT SERPL-MCNC: 0.2 MG/DL (ref 0–0.2)
BILIRUB SERPL-MCNC: 0.7 MG/DL (ref 0.2–1.3)
PROT SERPL-MCNC: 7.7 G/DL (ref 6.8–8.8)

## 2021-12-01 PROCEDURE — 82728 ASSAY OF FERRITIN: CPT | Performed by: PATHOLOGY

## 2021-12-01 PROCEDURE — 83550 IRON BINDING TEST: CPT | Performed by: PATHOLOGY

## 2021-12-01 PROCEDURE — 86704 HEP B CORE ANTIBODY TOTAL: CPT | Mod: 90 | Performed by: PATHOLOGY

## 2021-12-01 PROCEDURE — 87340 HEPATITIS B SURFACE AG IA: CPT | Mod: 90 | Performed by: PATHOLOGY

## 2021-12-01 PROCEDURE — 99000 SPECIMEN HANDLING OFFICE-LAB: CPT | Performed by: PATHOLOGY

## 2021-12-01 PROCEDURE — 36415 COLL VENOUS BLD VENIPUNCTURE: CPT | Performed by: PATHOLOGY

## 2021-12-01 PROCEDURE — 80076 HEPATIC FUNCTION PANEL: CPT | Performed by: PATHOLOGY

## 2021-12-03 ENCOUNTER — PRE VISIT (OUTPATIENT)
Dept: GASTROENTEROLOGY | Facility: CLINIC | Age: 61
End: 2021-12-03
Payer: COMMERCIAL

## 2021-12-03 ENCOUNTER — OFFICE VISIT (OUTPATIENT)
Dept: GASTROENTEROLOGY | Facility: CLINIC | Age: 61
End: 2021-12-03
Attending: NURSE PRACTITIONER
Payer: COMMERCIAL

## 2021-12-03 VITALS
HEART RATE: 87 BPM | HEIGHT: 69 IN | OXYGEN SATURATION: 99 % | SYSTOLIC BLOOD PRESSURE: 158 MMHG | TEMPERATURE: 98.3 F | DIASTOLIC BLOOD PRESSURE: 78 MMHG | BODY MASS INDEX: 32.47 KG/M2 | WEIGHT: 219.2 LBS

## 2021-12-03 DIAGNOSIS — E66.811 CLASS 1 OBESITY IN ADULT, UNSPECIFIED BMI, UNSPECIFIED OBESITY TYPE, UNSPECIFIED WHETHER SERIOUS COMORBIDITY PRESENT: Primary | ICD-10-CM

## 2021-12-03 DIAGNOSIS — R93.5 ABNORMAL US (ULTRASOUND) OF ABDOMEN: ICD-10-CM

## 2021-12-03 DIAGNOSIS — I15.9 SECONDARY HYPERTENSION: ICD-10-CM

## 2021-12-03 LAB
FERRITIN SERPL-MCNC: 44 NG/ML (ref 26–388)
HBV CORE AB SERPL QL IA: NONREACTIVE
HBV SURFACE AG SERPL QL IA: NONREACTIVE
IRON SATN MFR SERPL: 18 % (ref 15–46)
IRON SERPL-MCNC: 64 UG/DL (ref 35–180)
TIBC SERPL-MCNC: 358 UG/DL (ref 240–430)

## 2021-12-03 PROCEDURE — G0463 HOSPITAL OUTPT CLINIC VISIT: HCPCS

## 2021-12-03 PROCEDURE — 99204 OFFICE O/P NEW MOD 45 MIN: CPT | Performed by: STUDENT IN AN ORGANIZED HEALTH CARE EDUCATION/TRAINING PROGRAM

## 2021-12-03 ASSESSMENT — PAIN SCALES - GENERAL: PAINLEVEL: NO PAIN (0)

## 2021-12-03 ASSESSMENT — MIFFLIN-ST. JEOR: SCORE: 1794.89

## 2021-12-03 NOTE — PROGRESS NOTES
[facility] New Patient Visit    Date of Visit: December 3, 2021    Reason for referral: abnormal US    Subjective: Mr. Wolf is a 61 year old man with a history of obesity, HTN, who presents for evaluation of an abnormal liver on kidney US    He was first told he had liver disease earlier this fall when he had a renal US that incidentally showed increased hepatic echogenicity. He had LFTs recently that were normal.     No previous known liver disease, abnormal LFTs, imaging tests. No known history of liver decompensation    He drinks alcohol about once a month. No history of heavy use.     He weighs around 210 lbs, generally has been between 190 and 215 the past few years.     Hepatitis C testing negative in the past    ROS: 14 point ROS negative except for positives noted in HPI.    PMHx:  Past Medical History:   Diagnosis Date     Hypertension    No DM  No HLD    PSHx:  Past Surgical History:   Procedure Laterality Date     KNEE SURGERY Right     Mensicus repair       FamHx:  Family History   Problem Relation Age of Onset     Hypertension Mother      Hypertension Father      Cancer Father         Bilary   Mother with non cancerous tumor in kidney    SocHx:  Social History     Socioeconomic History     Marital status:      Spouse name: Not on file     Number of children: Not on file     Years of education: Not on file     Highest education level: Not on file   Occupational History     Not on file   Tobacco Use     Smoking status: Never Smoker     Smokeless tobacco: Never Used   Substance and Sexual Activity     Alcohol use: Yes     Drug use: Never     Sexual activity: Not Currently     Partners: Female   Other Topics Concern     Not on file   Social History Narrative     Not on file     Social Determinants of Health     Financial Resource Strain: Not on file   Food Insecurity: Not on file   Transportation Needs: Not on file   Physical Activity: Not on file   Stress: Not on file  "  Social Connections: Not on file   Intimate Partner Violence: Not on file   Housing Stability: Not on file       Medications:  Current Outpatient Medications   Medication     amLODIPine (NORVASC) 10 MG tablet     No current facility-administered medications for this visit.   MVI  No OTCs, herbals    Allergies:  No Known Allergies    Objective:  BP (!) 158/78   Pulse 87   Temp 98.3  F (36.8  C) (Oral)   Ht 1.745 m (5' 8.7\")   Wt 99.4 kg (219 lb 3.2 oz)   SpO2 99%   BMI 32.65 kg/m    Constitutional: pleasant man in NAD  Eyes: non icteric  Respiratory: Normal respiratory excursion   MSK: normal range of motion of visualized extremities  Abd: Non distended  Skin: No jaundice  Psychiatric: normal mood and orientation    Labs:  Last Comprehensive Metabolic Panel:  Sodium   Date Value Ref Range Status   11/18/2021 139 133 - 144 mmol/L Final   01/20/2021 140 133 - 144 mmol/L Final     Potassium   Date Value Ref Range Status   11/18/2021 3.7 3.4 - 5.3 mmol/L Final   01/20/2021 3.5 3.4 - 5.3 mmol/L Final     Chloride   Date Value Ref Range Status   11/18/2021 107 94 - 109 mmol/L Final   01/20/2021 104 94 - 109 mmol/L Final     Carbon Dioxide   Date Value Ref Range Status   01/20/2021 30 20 - 32 mmol/L Final     Carbon Dioxide (CO2)   Date Value Ref Range Status   11/18/2021 28 20 - 32 mmol/L Final     Anion Gap   Date Value Ref Range Status   11/18/2021 4 3 - 14 mmol/L Final   01/20/2021 6 3 - 14 mmol/L Final     Glucose   Date Value Ref Range Status   11/18/2021 125 (H) 70 - 99 mg/dL Final   01/20/2021 106 (H) 70 - 99 mg/dL Final     Urea Nitrogen   Date Value Ref Range Status   11/18/2021 17 7 - 30 mg/dL Final   01/20/2021 17 7 - 30 mg/dL Final     Creatinine   Date Value Ref Range Status   11/18/2021 1.40 (H) 0.66 - 1.25 mg/dL Final   01/20/2021 1.35 (H) 0.66 - 1.25 mg/dL Final     GFR Estimate   Date Value Ref Range Status   11/18/2021 55 (L) >60 mL/min/1.73m2 Final     Comment:     As of July 11, 2021, eGFR is " calculated by the CKD-EPI creatinine equation, without race adjustment. eGFR can be influenced by muscle mass, exercise, and diet. The reported eGFR is an estimation only and is only applicable if the renal function is stable.   01/20/2021 57 (L) >60 mL/min/[1.73_m2] Final     Comment:     Non  GFR Calc  Starting 12/18/2018, serum creatinine based estimated GFR (eGFR) will be   calculated using the Chronic Kidney Disease Epidemiology Collaboration   (CKD-EPI) equation.       Calcium   Date Value Ref Range Status   11/18/2021 9.0 8.5 - 10.1 mg/dL Final   01/20/2021 9.0 8.5 - 10.1 mg/dL Final     Bilirubin Total   Date Value Ref Range Status   12/01/2021 0.7 0.2 - 1.3 mg/dL Final     Alkaline Phosphatase   Date Value Ref Range Status   12/01/2021 89 40 - 150 U/L Final     ALT   Date Value Ref Range Status   12/01/2021 32 0 - 70 U/L Final     AST   Date Value Ref Range Status   12/01/2021 18 0 - 45 U/L Final     Imaging:    Renal US 11/2021    2. Increased hepatic echogenicity and echotexture incidentally noted.  Differential includes hepatitis, cirrhosis, and hepatosteatosis. If  there is concern for liver pathology, further evaluation with CT or MR  could be considered.    Independently reviewed labs and imaging.     Assessment/Plan: Mr. Wolf is a 61 year old man with a history of obesity, HTN, who presents for evaluation of an abnormal liver on kidney US. LFTs are normal. Suspect this is related to fatty liver disease. No significant alcohol. Hepatitis C testing negative in the past. Recommend work up for other causes of liver disease. Also recommend dedicated Liver US to further characterize liver.     Discussed the natural history of NAFLD, overtime steatosis and inflammation can lead to scarring and cirrhosis. Treatment is 5-10% weight loss with diet and exercise to reduce inflammation and scarring.     Orders Placed This Encounter   Procedures     US Abdomen Limited     Hepatitis B surface  antigen     Hepatitis B core antibody     Iron and iron binding capacity     Ferritin     Alpha 1 Antitrypsin     RTC 12 months.    Josette Thurman MD MS  Hepatology/Liver Transplant  [facility]

## 2021-12-03 NOTE — TELEPHONE ENCOUNTER
RECORDS RECEIVED FROM: Internal   DATE RECEIVED: 12.06.2021   NOTES STATUS DETAILS   OFFICE NOTE from referring provider Internal 11.18.2021 Leta Miles, STEPHANIE CNP   OFFICE NOTE from other specialist  N/A    *Only VASCULITIS or LUPUS gather office notes for the following N/A    *PULMONARY   N/A    *ENT N/A    *DERMATOLOGY N/A    *RHEUMATOLOGY N/A    DISCHARGE SUMMARY from hospital N/A    DISCHARGE REPORT from the ER N/A    MEDICATION LIST Internal / CE    IMAGING  (NEED IMAGES AND REPORTS)     KIDNEY CT SCAN N/A    KIDNEY ULTRASOUND Internal 11.24.2021 US RENAL COMPLETE WITH DUPLEX    MR ABDOMEN N/A    NUCLEAR MEDICINE RENAL N/A    LABS     CBC N/A    CMP N/A    BMP Internal 11.18.2021   UA Care Everywhere 12.22.2016   URINE PROTEIN Care Everywhere 12.22.2016   RENAL PANEL N/A    BIOPSY     KIDNEY BIOPSY  N/A

## 2021-12-03 NOTE — LETTER
12/3/2021     RE: Josiah Wolf  [address]  [facility] [address]    Dear Colleague,    Thank you for referring your patient, Josiah Wolf, to the [facility]PAT[facility]IN[facility] [place]. Please see a copy of my visit note below.    [facility] New Patient Visit    Date of Visit: December 3, 2021    Reason for referral: abnormal US    Subjective: Mr. Wolf is a 61 year old man with a history of obesity, HTN, who presents for evaluation of an abnormal liver on kidney US    He was first told he had liver disease earlier this fall when he had a renal US that incidentally showed increased hepatic echogenicity. He had LFTs recently that were normal.     No previous known liver disease, abnormal LFTs, imaging tests. No known history of liver decompensation    He drinks alcohol about once a month. No history of heavy use.     He weighs around 210 lbs, generally has been between 190 and 215 the past few years.     Hepatitis C testing negative in the past    ROS: 14 point ROS negative except for positives noted in HPI.    PMHx:  Past Medical History:   Diagnosis Date     Hypertension    No DM  No HLD    PSHx:  Past Surgical History:   Procedure Laterality Date     KNEE SURGERY Right     Mensicus repair       FamHx:  Family History   Problem Relation Age of Onset     Hypertension Mother      Hypertension Father      Cancer Father         Bilary   Mother with non cancerous tumor in kidney    SocHx:  Social History     Socioeconomic History     Marital status:      Spouse name: Not on file     Number of children: Not on file     Years of education: Not on file     Highest education level: Not on file   Occupational History     Not on file   Tobacco Use     Smoking status: Never Smoker     Smokeless tobacco: Never Used   Substance and Sexual Activity     Alcohol use: Yes     Drug use: Never     Sexual activity: Not Currently     Partners: Female   Other Topics Concern  "    Not on file   Social History Narrative     Not on file     Social Determinants of Health     Financial Resource Strain: Not on file   Food Insecurity: Not on file   Transportation Needs: Not on file   Physical Activity: Not on file   Stress: Not on file   Social Connections: Not on file   Intimate Partner Violence: Not on file   Housing Stability: Not on file       Medications:  Current Outpatient Medications   Medication     amLODIPine (NORVASC) 10 MG tablet     No current facility-administered medications for this visit.   MVI  No OTCs, herbals    Allergies:  No Known Allergies    Objective:  BP (!) 158/78   Pulse 87   Temp 98.3  F (36.8  C) (Oral)   Ht 1.745 m (5' 8.7\")   Wt 99.4 kg (219 lb 3.2 oz)   SpO2 99%   BMI 32.65 kg/m    Constitutional: pleasant man in NAD  Eyes: non icteric  Respiratory: Normal respiratory excursion   MSK: normal range of motion of visualized extremities  Abd: Non distended  Skin: No jaundice  Psychiatric: normal mood and orientation    Labs:  Last Comprehensive Metabolic Panel:  Sodium   Date Value Ref Range Status   11/18/2021 139 133 - 144 mmol/L Final   01/20/2021 140 133 - 144 mmol/L Final     Potassium   Date Value Ref Range Status   11/18/2021 3.7 3.4 - 5.3 mmol/L Final   01/20/2021 3.5 3.4 - 5.3 mmol/L Final     Chloride   Date Value Ref Range Status   11/18/2021 107 94 - 109 mmol/L Final   01/20/2021 104 94 - 109 mmol/L Final     Carbon Dioxide   Date Value Ref Range Status   01/20/2021 30 20 - 32 mmol/L Final     Carbon Dioxide (CO2)   Date Value Ref Range Status   11/18/2021 28 20 - 32 mmol/L Final     Anion Gap   Date Value Ref Range Status   11/18/2021 4 3 - 14 mmol/L Final   01/20/2021 6 3 - 14 mmol/L Final     Glucose   Date Value Ref Range Status   11/18/2021 125 (H) 70 - 99 mg/dL Final   01/20/2021 106 (H) 70 - 99 mg/dL Final     Urea Nitrogen   Date Value Ref Range Status   11/18/2021 17 7 - 30 mg/dL Final   01/20/2021 17 7 - 30 mg/dL Final     Creatinine "   Date Value Ref Range Status   11/18/2021 1.40 (H) 0.66 - 1.25 mg/dL Final   01/20/2021 1.35 (H) 0.66 - 1.25 mg/dL Final     GFR Estimate   Date Value Ref Range Status   11/18/2021 55 (L) >60 mL/min/1.73m2 Final     Comment:     As of July 11, 2021, eGFR is calculated by the CKD-EPI creatinine equation, without race adjustment. eGFR can be influenced by muscle mass, exercise, and diet. The reported eGFR is an estimation only and is only applicable if the renal function is stable.   01/20/2021 57 (L) >60 mL/min/[1.73_m2] Final     Comment:     Non  GFR Calc  Starting 12/18/2018, serum creatinine based estimated GFR (eGFR) will be   calculated using the Chronic Kidney Disease Epidemiology Collaboration   (CKD-EPI) equation.       Calcium   Date Value Ref Range Status   11/18/2021 9.0 8.5 - 10.1 mg/dL Final   01/20/2021 9.0 8.5 - 10.1 mg/dL Final     Bilirubin Total   Date Value Ref Range Status   12/01/2021 0.7 0.2 - 1.3 mg/dL Final     Alkaline Phosphatase   Date Value Ref Range Status   12/01/2021 89 40 - 150 U/L Final     ALT   Date Value Ref Range Status   12/01/2021 32 0 - 70 U/L Final     AST   Date Value Ref Range Status   12/01/2021 18 0 - 45 U/L Final     Imaging:    Renal US 11/2021    2. Increased hepatic echogenicity and echotexture incidentally noted.  Differential includes hepatitis, cirrhosis, and hepatosteatosis. If  there is concern for liver pathology, further evaluation with CT or MR  could be considered.    Independently reviewed labs and imaging.     Assessment/Plan: Mr. Wolf is a 61 year old man with a history of obesity, HTN, who presents for evaluation of an abnormal liver on kidney US. LFTs are normal. Suspect this is related to fatty liver disease. No significant alcohol. Hepatitis C testing negative in the past. Recommend work up for other causes of liver disease. Also recommend dedicated Liver US to further characterize liver.     Discussed the natural history of NAFLD,  overtime steatosis and inflammation can lead to scarring and cirrhosis. Treatment is 5-10% weight loss with diet and exercise to reduce inflammation and scarring.     Orders Placed This Encounter   Procedures     US Abdomen Limited     Hepatitis B surface antigen     Hepatitis B core antibody     Iron and iron binding capacity     Ferritin     Alpha 1 Antitrypsin     RTC 12 months.    Josette Thurman MD MS  Hepatology/Liver Transplant  [facility]

## 2021-12-03 NOTE — NURSING NOTE
"Chief Complaint   Patient presents with     Consult     Abnormal US     BP (!) 158/78   Pulse 87   Temp 98.3  F (36.8  C) (Oral)   Ht 1.745 m (5' 8.7\")   Wt 99.4 kg (219 lb 3.2 oz)   SpO2 99%   BMI 32.65 kg/m       Miya Lundberg MA  "

## 2021-12-03 NOTE — LETTER
12/3/2021     RE: Josiah Wolf  [address]  [place] [address]    [facility] Liver Clinic New Patient Visit    Date of Visit: December 3, 2021    Reason for referral: abnormal US    Subjective: Mr. Wolf is a 61 year old man with a history of obesity, HTN, who presents for evaluation of an abnormal liver on kidney US    He was first told he had liver disease earlier this fall when he had a renal US that incidentally showed increased hepatic echogenicity. He had LFTs recently that were normal.     No previous known liver disease, abnormal LFTs, imaging tests. No known history of liver decompensation    He drinks alcohol about once a month. No history of heavy use.     He weighs around 210 lbs, generally has been between 190 and 215 the past few years.     Hepatitis C testing negative in the past    ROS: 14 point ROS negative except for positives noted in HPI.    PMHx:  Past Medical History:   Diagnosis Date     Hypertension    No DM  No HLD    PSHx:  Past Surgical History:   Procedure Laterality Date     KNEE SURGERY Right     Mensicus repair       FamHx:  Family History   Problem Relation Age of Onset     Hypertension Mother      Hypertension Father      Cancer Father         Bilary   Mother with non cancerous tumor in kidney    SocHx:  Social History     Socioeconomic History     Marital status:      Spouse name: Not on file     Number of children: Not on file     Years of education: Not on file     Highest education level: Not on file   Occupational History     Not on file   Tobacco Use     Smoking status: Never Smoker     Smokeless tobacco: Never Used   Substance and Sexual Activity     Alcohol use: Yes     Drug use: Never     Sexual activity: Not Currently     Partners: Female   Other Topics Concern     Not on file   Social History Narrative     Not on file     Social Determinants of Health     Financial Resource Strain: Not on file   Food Insecurity: Not on file  "  Transportation Needs: Not on file   Physical Activity: Not on file   Stress: Not on file   Social Connections: Not on file   Intimate Partner Violence: Not on file   Housing Stability: Not on file       Medications:  Current Outpatient Medications   Medication     amLODIPine (NORVASC) 10 MG tablet     No current facility-administered medications for this visit.   MVI  No OTCs, herbals    Allergies:  No Known Allergies    Objective:  BP (!) 158/78   Pulse 87   Temp 98.3  F (36.8  C) (Oral)   Ht 1.745 m (5' 8.7\")   Wt 99.4 kg (219 lb 3.2 oz)   SpO2 99%   BMI 32.65 kg/m    Constitutional: pleasant man in NAD  Eyes: non icteric  Respiratory: Normal respiratory excursion   MSK: normal range of motion of visualized extremities  Abd: Non distended  Skin: No jaundice  Psychiatric: normal mood and orientation    Labs:  Last Comprehensive Metabolic Panel:  Sodium   Date Value Ref Range Status   11/18/2021 139 133 - 144 mmol/L Final   01/20/2021 140 133 - 144 mmol/L Final     Potassium   Date Value Ref Range Status   11/18/2021 3.7 3.4 - 5.3 mmol/L Final   01/20/2021 3.5 3.4 - 5.3 mmol/L Final     Chloride   Date Value Ref Range Status   11/18/2021 107 94 - 109 mmol/L Final   01/20/2021 104 94 - 109 mmol/L Final     Carbon Dioxide   Date Value Ref Range Status   01/20/2021 30 20 - 32 mmol/L Final     Carbon Dioxide (CO2)   Date Value Ref Range Status   11/18/2021 28 20 - 32 mmol/L Final     Anion Gap   Date Value Ref Range Status   11/18/2021 4 3 - 14 mmol/L Final   01/20/2021 6 3 - 14 mmol/L Final     Glucose   Date Value Ref Range Status   11/18/2021 125 (H) 70 - 99 mg/dL Final   01/20/2021 106 (H) 70 - 99 mg/dL Final     Urea Nitrogen   Date Value Ref Range Status   11/18/2021 17 7 - 30 mg/dL Final   01/20/2021 17 7 - 30 mg/dL Final     Creatinine   Date Value Ref Range Status   11/18/2021 1.40 (H) 0.66 - 1.25 mg/dL Final   01/20/2021 1.35 (H) 0.66 - 1.25 mg/dL Final     GFR Estimate   Date Value Ref Range Status "   11/18/2021 55 (L) >60 mL/min/1.73m2 Final     Comment:     As of July 11, 2021, eGFR is calculated by the CKD-EPI creatinine equation, without race adjustment. eGFR can be influenced by muscle mass, exercise, and diet. The reported eGFR is an estimation only and is only applicable if the renal function is stable.   01/20/2021 57 (L) >60 mL/min/[1.73_m2] Final     Comment:     Non  GFR Calc  Starting 12/18/2018, serum creatinine based estimated GFR (eGFR) will be   calculated using the Chronic Kidney Disease Epidemiology Collaboration   (CKD-EPI) equation.       Calcium   Date Value Ref Range Status   11/18/2021 9.0 8.5 - 10.1 mg/dL Final   01/20/2021 9.0 8.5 - 10.1 mg/dL Final     Bilirubin Total   Date Value Ref Range Status   12/01/2021 0.7 0.2 - 1.3 mg/dL Final     Alkaline Phosphatase   Date Value Ref Range Status   12/01/2021 89 40 - 150 U/L Final     ALT   Date Value Ref Range Status   12/01/2021 32 0 - 70 U/L Final     AST   Date Value Ref Range Status   12/01/2021 18 0 - 45 U/L Final     Imaging:    Renal US 11/2021    2. Increased hepatic echogenicity and echotexture incidentally noted.  Differential includes hepatitis, cirrhosis, and hepatosteatosis. If  there is concern for liver pathology, further evaluation with CT or MR  could be considered.    Independently reviewed labs and imaging.     Assessment/Plan: Mr. Wolf is a 61 year old man with a history of obesity, HTN, who presents for evaluation of an abnormal liver on kidney US. LFTs are normal. Suspect this is related to fatty liver disease. No significant alcohol. Hepatitis C testing negative in the past. Recommend work up for other causes of liver disease. Also recommend dedicated Liver US to further characterize liver.     Discussed the natural history of NAFLD, overtime steatosis and inflammation can lead to scarring and cirrhosis. Treatment is 5-10% weight loss with diet and exercise to reduce inflammation and scarring.      Orders Placed This Encounter   Procedures     US Abdomen Limited     Hepatitis B surface antigen     Hepatitis B core antibody     Iron and iron binding capacity     Ferritin     Alpha 1 Antitrypsin     RTC 12 months.    Josette Thurman MD MS  Hepatology/Liver Transplant  [facility]

## 2021-12-06 ENCOUNTER — VIRTUAL VISIT (OUTPATIENT)
Dept: NEPHROLOGY | Facility: CLINIC | Age: 61
End: 2021-12-06
Attending: NURSE PRACTITIONER
Payer: COMMERCIAL

## 2021-12-06 ENCOUNTER — PRE VISIT (OUTPATIENT)
Dept: NEPHROLOGY | Facility: CLINIC | Age: 61
End: 2021-12-06
Payer: COMMERCIAL

## 2021-12-06 DIAGNOSIS — I10 HTN, GOAL BELOW 140/90: ICD-10-CM

## 2021-12-06 DIAGNOSIS — N18.31 CHRONIC KIDNEY DISEASE, STAGE 3A (H): ICD-10-CM

## 2021-12-06 DIAGNOSIS — R79.89 ELEVATED SERUM CREATININE: ICD-10-CM

## 2021-12-06 NOTE — LETTER
12/6/2021     RE: Josiah Wolf  [address]  [place] [facility] [address]     Dear Colleague,    Thank you for referring your patient, Josiah Wolf, to the [facility] NEPHROLOGY CLINIC [place] [facility]. Please see a copy of my visit note below.    Left voicemail for patient to call back to set up telemedicine visit, will call again before appointment time.  Vivian Harrell CMA on 12/6/2021 at 9:49 AM    No show    Again, thank you for allowing me to participate in the care of your patient.      Sincerely,    Fide Sung MD

## 2021-12-06 NOTE — PROGRESS NOTES
Left voicemail for patient to call back to set up telemedicine visit, will call again before appointment time.  Vivian Harrell CMA on 12/6/2021 at 9:49 AM    No show

## 2022-04-28 ENCOUNTER — OFFICE VISIT (OUTPATIENT)
Dept: FAMILY MEDICINE | Facility: CLINIC | Age: 62
End: 2022-04-28
Payer: COMMERCIAL

## 2022-04-28 VITALS
DIASTOLIC BLOOD PRESSURE: 74 MMHG | HEIGHT: 69 IN | WEIGHT: 218 LBS | TEMPERATURE: 97.9 F | HEART RATE: 78 BPM | OXYGEN SATURATION: 98 % | BODY MASS INDEX: 32.29 KG/M2 | SYSTOLIC BLOOD PRESSURE: 128 MMHG

## 2022-04-28 DIAGNOSIS — M25.562 ACUTE PAIN OF LEFT KNEE: ICD-10-CM

## 2022-04-28 DIAGNOSIS — M70.50 PES ANSERINE BURSITIS: Primary | ICD-10-CM

## 2022-04-28 PROBLEM — J96.01 ACUTE RESPIRATORY FAILURE WITH HYPOXIA (H): Status: RESOLVED | Noted: 2021-01-06 | Resolved: 2022-04-28

## 2022-04-28 PROCEDURE — 99213 OFFICE O/P EST LOW 20 MIN: CPT | Performed by: STUDENT IN AN ORGANIZED HEALTH CARE EDUCATION/TRAINING PROGRAM

## 2022-04-28 NOTE — PROGRESS NOTES
Assessment & Plan     Pes anserine bursitis  Discussed etiology of pain and used pictures to describe. Discussed conservative management, ice, rest, PT, voltaren gel vs oral NSAIDs/tylenol. Advised stretching and strengthening of the hamstring and quadriceps.    - Physical Therapy Referral; Future  - diclofenac (VOLTAREN) 1 % topical gel; Apply 4 g topically 4 times daily as needed for moderate pain    Acute pain of left knee  Suspect potential minor meniscus tear based on exam, discussed conservative management initially, will refer for PT. If not improving with 4-8 weeks of PT, consider MRI vs steroid injection vs referral to sports medicine  - Physical Therapy Referral; Future  - diclofenac (VOLTAREN) 1 % topical gel; Apply 4 g topically 4 times daily as needed for moderate pain      Return in about 6 weeks (around 6/9/2022) for Follow up.    [facility] is a 62 year old who presents for the following health issues     History of Present Illness       Reason for visit:  Left knee  Symptom onset:  More than a month  Symptoms include:  Stiffness, pain  Symptom intensity:  Mild  Symptom progression:  Staying the same  Had these symptoms before:  Yes  Has tried/received treatment for these symptoms:  No  What makes it worse:  Steps    He eats 2-3 servings of fruits and vegetables daily.He consumes 0 sweetened beverage(s) daily.He exercises with enough effort to increase his heart rate 9 or less minutes per day.  He exercises with enough effort to increase his heart rate 3 or less days per week.   He is taking medications regularly.       Pain History:  Description  Location: Left knee  Joint Swelling: not currently but at times.   Redness: no  Pain: YES shooting pain, tightness. General ache.  Warmth: no  Intensity:  At its worse 5/10  Progression of Symptoms:  Comes/goes  Accompanying signs and symptoms:   Fevers: no  Numbness/tingling/weakness:  "no  History  Trauma to the area: no  Previous similar problem: YES  Previous evaluation:  YES tear meniscus 2011? Clean up tear.  [facility] malave. unknown location   Precipitating or alleviating factors:  Aggravating factors include: Steps will make is noticeable   Therapies tried and outcome: hot tub             No injury or overuse.   Started about 4 months ago.   Some days notices it daily and other days doesn't notice it.   Worse when getting up from a seated position.   Does feel some edema above the kneecap at times.   Feels shooting pain on medial knee when knee is extended or goes to stand up from seated. Worse with stairs.   No hx of left knee injury or issues in the past.  Hx of right knee meniscus injury.       Hx: Right Meniscal surgery with [place] 2012            Review of Systems   Constitutional, HEENT, cardiovascular, pulmonary, gi and gu systems are negative, except as otherwise noted.      Objective    /74 (BP Location: Right arm, Patient Position: Sitting, Cuff Size: Adult Large)   Pulse 78   Temp 97.9  F (36.6  C) (Oral)   Ht 1.745 m (5' 8.7\")   Wt 98.9 kg (218 lb)   SpO2 98%   BMI 32.47 kg/m    Body mass index is 32.47 kg/m .  Physical Exam   GENERAL: healthy, alert and no distress  NECK: no adenopathy, no asymmetry, masses, or scars and thyroid normal to palpation  RESP: lungs clear to auscultation - no rales, rhonchi or wheezes  CV: regular rate and rhythm, normal S1 S2, no S3 or S4, no murmur, click or rub, no peripheral edema and peripheral pulses strong  MS: no gross musculoskeletal defects noted, trace L knee edema. Full ROM and strength of LE b/l. Negative anterior and posterior drawer test. Varus and valgus stress normal. Mild pain with thessaly's test. Negative mcmurrys and lachmans test. Tenderness to palpation over medial left knee join line and over pes anserine bursa on left.   NEURO: Normal strength and tone, mentation intact and speech normal  PSYCH: mentation " appears normal, affect normal/bright

## 2022-04-28 NOTE — PATIENT INSTRUCTIONS
Start treating with  Physical therapy, ice, relative rest (avoid activities that aggravate your pain), stretching of hamstring and quadriceps muscles, and NSAIDs as needed (trial voltaren gel up to 4 times daily to areas of pain as needed). If not improving after 4-6 weeks of therapy, then return for follow up - consider MRI vs steroid injection at that time

## 2022-05-05 ENCOUNTER — THERAPY VISIT (OUTPATIENT)
Dept: PHYSICAL THERAPY | Facility: CLINIC | Age: 62
End: 2022-05-05
Attending: STUDENT IN AN ORGANIZED HEALTH CARE EDUCATION/TRAINING PROGRAM
Payer: COMMERCIAL

## 2022-05-05 DIAGNOSIS — M25.562 ACUTE PAIN OF LEFT KNEE: ICD-10-CM

## 2022-05-05 DIAGNOSIS — M70.50 PES ANSERINE BURSITIS: ICD-10-CM

## 2022-05-05 PROCEDURE — 97161 PT EVAL LOW COMPLEX 20 MIN: CPT | Mod: GP | Performed by: PHYSICAL THERAPIST

## 2022-05-05 PROCEDURE — 97110 THERAPEUTIC EXERCISES: CPT | Mod: GP | Performed by: PHYSICAL THERAPIST

## 2022-05-05 PROCEDURE — 97112 NEUROMUSCULAR REEDUCATION: CPT | Mod: GP | Performed by: PHYSICAL THERAPIST

## 2022-05-05 ASSESSMENT — ACTIVITIES OF DAILY LIVING (ADL)
WEAKNESS: I HAVE THE SYMPTOM BUT IT DOES NOT AFFECT MY ACTIVITY
STAND: NOT ANSWERED
KNEEL ON THE FRONT OF YOUR KNEE: NOT ANSWERED
SWELLING: I DO NOT HAVE THE SYMPTOM
STIFFNESS: THE SYMPTOM AFFECTS MY ACTIVITY SLIGHTLY
HOW_WOULD_YOU_RATE_THE_OVERALL_FUNCTION_OF_YOUR_KNEE_DURING_YOUR_USUAL_DAILY_ACTIVITIES?: NEARLY NORMAL
GO UP STAIRS: ACTIVITY IS SOMEWHAT DIFFICULT
PAIN: THE SYMPTOM AFFECTS MY ACTIVITY SLIGHTLY
KNEE_ACTIVITY_OF_DAILY_LIVING_SUM: 49
WALK: ACTIVITY IS NOT DIFFICULT
RISE FROM A CHAIR: ACTIVITY IS MINIMALLY DIFFICULT
SQUAT: ACTIVITY IS NOT DIFFICULT
HOW_WOULD_YOU_RATE_THE_CURRENT_FUNCTION_OF_YOUR_KNEE_DURING_YOUR_USUAL_DAILY_ACTIVITIES_ON_A_SCALE_FROM_0_TO_100_WITH_100_BEING_YOUR_LEVEL_OF_KNEE_FUNCTION_PRIOR_TO_YOUR_INJURY_AND_0_BEING_THE_INABILITY_TO_PERFORM_ANY_OF_YOUR_USUAL_DAILY_ACTIVITIES?: 90
GIVING WAY, BUCKLING OR SHIFTING OF KNEE: I DO NOT HAVE THE SYMPTOM
LIMPING: I HAVE THE SYMPTOM BUT IT DOES NOT AFFECT MY ACTIVITY
AS_A_RESULT_OF_YOUR_KNEE_INJURY,_HOW_WOULD_YOU_RATE_YOUR_CURRENT_LEVEL_OF_DAILY_ACTIVITY?: NEARLY NORMAL
GO DOWN STAIRS: ACTIVITY IS MINIMALLY DIFFICULT
SIT WITH YOUR KNEE BENT: ACTIVITY IS MINIMALLY DIFFICULT

## 2022-05-05 NOTE — PROGRESS NOTES
Physical Therapy Initial Evaluation  Subjective:    Patient Health History  Josiah Wolf being seen for Left knee .     Problem began: 4/28/2022 (MD visit ).   Problem occurred: Started several months prior pt noticed increased pain, noted with stairs and longer distance walking   Pain is reported as 0/10 (6/10 worst) on pain scale.  General health as reported by patient is good.  Pertinent medical history includes: high blood pressure.        Surgeries include:  Orthopedic surgery. Other surgery history details: Preious R knee meniscus repair x10+ years ago.        Current occupation is  at a car [employer].   Primary job tasks include:  Computer work, repetitive tasks and prolonged standing.                  Therapist Generated HPI Evaluation         Type of problem:  Left knee.    This is a chronic condition.  Condition occurred with:  Insidious onset.  Where condition occurred: for unknown reasons.  Patient reports pain:  Medial and anterior.  Pain is described as aching and sharp (Occasional sharp pain on medial side) and is intermittent.  Pain radiates to:  Knee. Pain is the same all the time (activity dependent).  Since onset symptoms are unchanged.  Associated symptoms:  Loss of motion/stiffness and loss of strength. Symptoms are exacerbated by activity, ascending stairs, descending stairs and sitting (sitting too long knee feels stiff )  and relieved by NSAID's and heat (Prescription pain medication ).  Imaging testing: none yet.    Restrictions due to condition include:  Working in normal job without restrictions.  Barriers include:  Stairs.                        Objective:  Standing Alignment:              Knee:  Genu varus R and genu varus L    General deviations alignment: Typical gait, per pt with significant pain can result in limping   Gait:    Gait Type:  Normal   Assistive Devices:  None      Flexibility/Screens:       Lower Extremity:  Decreased left lower extremity  flexibility:Quadriceps; Hamstrings and Gastroc    Decreased right lower extremity flexibility:  Quadriceps; Hamstrings and Gastroc                                                      Knee Evaluation:  ROM:  AROM: normal        Pain: Pain with controlled knee bend and emphasis on descent   Endfeel: Firm   Strength:     Extension:  Left: 5/5   Pain:      Right: 5/5   Pain:  Flexion:  Left: 5/5   Pain:      Right: 5/5   Pain:    Quad Set Left:  Good    Pain: +/-   Quad Set Right:  Good    Pain: -  Ligament Testing:  Normal                Special Tests: Special test for knee: Noted clicking with knee AROM.  Left knee positive for the following special tests:  Meniscal    Palpation:    Left knee tenderness present at:  Medial Joint Line    Edema:  Normal      Functional Testing:  : Squats no pain           Proprioception:   [place] Balance Test:  Left:  12s, increased muscle strategy  Right:  30s  % of Uninvolved:           General     ROS    Assessment/Plan:    Patient is a 62 year old male with left side knee complaints.    Patient has the following significant findings with corresponding treatment plan.                Diagnosis 1:  L knee pain      Pain -  manual therapy, self management, education and home program  Decreased strength - therapeutic exercise, therapeutic activities and home program  Impaired balance - neuro re-education, therapeutic activities and home program  Impaired gait - gait training and home program  Decreased function - therapeutic activities and home program    Therapy Evaluation Codes:   Cumulative Therapy Evaluation is: Low complexity.    Previous and current functional limitations:  (See Goal Flow Sheet for this information)    Short term and Long term goals: (See Goal Flow Sheet for this information)     Communication ability:  Patient appears to be able to clearly communicate and understand verbal and written communication and follow directions correctly.  Treatment Explanation - The  following has been discussed with the patient:   RX ordered/plan of care  Anticipated outcomes  Possible risks and side effects  This patient would benefit from PT intervention to resume normal activities.   Rehab potential is good.    Frequency:  1 X week, once daily  Duration:  for 8 weeks  Discharge Plan:  Achieve all LTG.  Independent in home treatment program.  Return to previous functional level by discharge.  Reach maximal therapeutic benefit.    Please refer to the daily flowsheet for treatment today, total treatment time and time spent performing 1:1 timed codes.

## 2022-08-10 ENCOUNTER — OFFICE VISIT (OUTPATIENT)
Dept: FAMILY MEDICINE | Facility: CLINIC | Age: 62
End: 2022-08-10
Payer: COMMERCIAL

## 2022-08-10 VITALS
SYSTOLIC BLOOD PRESSURE: 138 MMHG | OXYGEN SATURATION: 98 % | HEART RATE: 82 BPM | WEIGHT: 201 LBS | TEMPERATURE: 97.7 F | DIASTOLIC BLOOD PRESSURE: 68 MMHG | BODY MASS INDEX: 29.94 KG/M2

## 2022-08-10 DIAGNOSIS — R73.9 HYPERGLYCEMIA: ICD-10-CM

## 2022-08-10 DIAGNOSIS — N18.31 CHRONIC KIDNEY DISEASE, STAGE 3A (H): Primary | ICD-10-CM

## 2022-08-10 DIAGNOSIS — Z13.220 SCREENING FOR HYPERLIPIDEMIA: ICD-10-CM

## 2022-08-10 DIAGNOSIS — I10 HYPERTENSION GOAL BP (BLOOD PRESSURE) < 140/90: ICD-10-CM

## 2022-08-10 DIAGNOSIS — I10 HTN, GOAL BELOW 140/90: ICD-10-CM

## 2022-08-10 DIAGNOSIS — Z12.11 SCREEN FOR COLON CANCER: ICD-10-CM

## 2022-08-10 LAB
ANION GAP SERPL CALCULATED.3IONS-SCNC: 6 MMOL/L (ref 3–14)
BUN SERPL-MCNC: 17 MG/DL (ref 7–30)
CALCIUM SERPL-MCNC: 9.2 MG/DL (ref 8.5–10.1)
CHLORIDE BLD-SCNC: 106 MMOL/L (ref 94–109)
CHOLEST SERPL-MCNC: 162 MG/DL
CO2 SERPL-SCNC: 26 MMOL/L (ref 20–32)
CREAT SERPL-MCNC: 1.35 MG/DL (ref 0.66–1.25)
FASTING STATUS PATIENT QL REPORTED: NO
GFR SERPL CREATININE-BSD FRML MDRD: 60 ML/MIN/1.73M2
GLUCOSE BLD-MCNC: 110 MG/DL (ref 70–99)
HBA1C MFR BLD: 5.6 % (ref 0–5.6)
HDLC SERPL-MCNC: 47 MG/DL
HGB BLD-MCNC: 12.8 G/DL (ref 13.3–17.7)
LDLC SERPL CALC-MCNC: 93 MG/DL
NONHDLC SERPL-MCNC: 115 MG/DL
POTASSIUM BLD-SCNC: 3.9 MMOL/L (ref 3.4–5.3)
SODIUM SERPL-SCNC: 138 MMOL/L (ref 133–144)
TRIGL SERPL-MCNC: 109 MG/DL

## 2022-08-10 PROCEDURE — 80061 LIPID PANEL: CPT | Performed by: FAMILY MEDICINE

## 2022-08-10 PROCEDURE — 83036 HEMOGLOBIN GLYCOSYLATED A1C: CPT | Performed by: FAMILY MEDICINE

## 2022-08-10 PROCEDURE — 80048 BASIC METABOLIC PNL TOTAL CA: CPT | Performed by: FAMILY MEDICINE

## 2022-08-10 PROCEDURE — 85018 HEMOGLOBIN: CPT | Performed by: FAMILY MEDICINE

## 2022-08-10 PROCEDURE — 36415 COLL VENOUS BLD VENIPUNCTURE: CPT | Performed by: FAMILY MEDICINE

## 2022-08-10 PROCEDURE — 99213 OFFICE O/P EST LOW 20 MIN: CPT | Performed by: FAMILY MEDICINE

## 2022-08-10 RX ORDER — AMLODIPINE BESYLATE 10 MG/1
10 TABLET ORAL DAILY
Qty: 90 TABLET | Refills: 3 | Status: SHIPPED | OUTPATIENT
Start: 2022-08-10 | End: 2023-07-24

## 2022-08-10 NOTE — PROGRESS NOTES
Assessment & Plan     Hypertension goal BP (blood pressure) < 140/90  Stable   - Basic metabolic panel  (Ca, Cl, CO2, Creat, Gluc, K, Na, BUN); Future  - Lipid panel reflex to direct LDL Fasting; Future  - Basic metabolic panel  (Ca, Cl, CO2, Creat, Gluc, K, Na, BUN)    Hyperglycemia  Pending   - Hemoglobin A1c; Future  - Hemoglobin A1c    Chronic kidney disease, stage 3a (H)  Pending labs   - Hemoglobin; Future    Screening for hyperlipidemia  Discussed   - Lipid panel reflex to direct LDL Non-fasting; Future  - Lipid panel reflex to direct LDL Non-fasting    Screen for colon cancer  Advised   - [employer])  Pt declined covid vaccine           Return in about 2 months (around 10/10/2022) for Physical Exam.    Pascale Nathan MD  [facility] WILFRID Rahman is a 62 year old{, presenting for the following health issues:  Refill Request      History of Present Illness       Hypertension: He presents for follow up of hypertension.  He does check blood pressure  regularly outside of the clinic. Outside blood pressures have been over 140/90. He does not follow a low salt diet.         Chronic Kidney Disease Follow-up      Do you take any over the counter pain medicine?: No    Review of Systems   CONSTITUTIONAL: NEGATIVE for fever, chills, change in weight  ENT/MOUTH: NEGATIVE for ear, mouth and throat problems  RESP: NEGATIVE for significant cough or SOB  CV: NEGATIVE for chest pain, palpitations or peripheral edema  GI: NEGATIVE for nausea, abdominal pain, heartburn, or change in bowel habits  PSYCHIATRIC: NEGATIVE for changes in mood or affect  ROS otherwise negative      Objective    /68 (BP Location: Right arm, Patient Position: Chair, Cuff Size: Adult Regular)   Pulse 82   Temp 97.7  F (36.5  C) (Oral)   Wt 91.2 kg (201 lb)   SpO2 98%   BMI 29.94 kg/m    Body mass index is 29.94 kg/m .  Physical Exam   GENERAL: healthy, alert and no distress  NECK: no adenopathy, no  asymmetry, masses, or scars and thyroid normal to palpation  RESP: lungs clear to auscultation - no rales, rhonchi or wheezes  CV: regular rate and rhythm, normal S1 S2, no S3 or S4, no murmur, click or rub, no peripheral edema and peripheral pulses strong  ABDOMEN: soft, nontender, no hepatosplenomegaly, no masses and bowel sounds normal  MS: no gross musculoskeletal defects noted, no edema  SKIN: no suspicious lesions or rashes    Pending labs        .  ..

## 2022-08-25 LAB — NONINV COLON CA DNA+OCC BLD SCRN STL QL: NEGATIVE

## 2022-11-19 ENCOUNTER — HEALTH MAINTENANCE LETTER (OUTPATIENT)
Age: 62
End: 2022-11-19

## 2023-07-05 ENCOUNTER — E-VISIT (OUTPATIENT)
Dept: FAMILY MEDICINE | Facility: CLINIC | Age: 63
End: 2023-07-05
Payer: COMMERCIAL

## 2023-07-05 DIAGNOSIS — R68.89 BLOOD PRESSURE ALTERATION: ICD-10-CM

## 2023-07-05 DIAGNOSIS — M54.2 NECK PAIN: Primary | ICD-10-CM

## 2023-07-05 PROCEDURE — 99207 PR NO CHARGE LOS: CPT | Performed by: NURSE PRACTITIONER

## 2023-07-05 NOTE — PATIENT INSTRUCTIONS
Thank you for choosing us for your care. I think an in-clinic visit would be best next steps based on your symptoms. Please schedule a clinic appointment; you won t be charged for this eVisit.      You can schedule an appointment right here in [facility], or call [phone]

## 2023-07-17 ASSESSMENT — ENCOUNTER SYMPTOMS
ARTHRALGIAS: 0
NERVOUS/ANXIOUS: 0
FEVER: 0
JOINT SWELLING: 0
CHILLS: 0
CONSTIPATION: 0
SHORTNESS OF BREATH: 0
HEARTBURN: 0
ABDOMINAL PAIN: 0
HEADACHES: 0
PARESTHESIAS: 0
EYE PAIN: 0
SORE THROAT: 0
MYALGIAS: 0
HEMATOCHEZIA: 0
FREQUENCY: 0
NAUSEA: 0
COUGH: 0
DIZZINESS: 0
DYSURIA: 0
HEMATURIA: 0
PALPITATIONS: 0
DIARRHEA: 0
WEAKNESS: 0

## 2023-07-24 ENCOUNTER — LAB (OUTPATIENT)
Dept: LAB | Facility: CLINIC | Age: 63
End: 2023-07-24
Payer: COMMERCIAL

## 2023-07-24 ENCOUNTER — ANCILLARY PROCEDURE (OUTPATIENT)
Dept: GENERAL RADIOLOGY | Facility: CLINIC | Age: 63
End: 2023-07-24
Attending: FAMILY MEDICINE
Payer: COMMERCIAL

## 2023-07-24 ENCOUNTER — OFFICE VISIT (OUTPATIENT)
Dept: FAMILY MEDICINE | Facility: CLINIC | Age: 63
End: 2023-07-24
Payer: COMMERCIAL

## 2023-07-24 VITALS
WEIGHT: 205 LBS | SYSTOLIC BLOOD PRESSURE: 132 MMHG | BODY MASS INDEX: 30.36 KG/M2 | HEART RATE: 78 BPM | TEMPERATURE: 97.7 F | RESPIRATION RATE: 14 BRPM | HEIGHT: 69 IN | OXYGEN SATURATION: 98 % | DIASTOLIC BLOOD PRESSURE: 70 MMHG

## 2023-07-24 DIAGNOSIS — G89.29 CHRONIC PAIN OF RIGHT KNEE: ICD-10-CM

## 2023-07-24 DIAGNOSIS — I10 HTN, GOAL BELOW 140/90: ICD-10-CM

## 2023-07-24 DIAGNOSIS — Z13.220 SCREENING FOR HYPERLIPIDEMIA: ICD-10-CM

## 2023-07-24 DIAGNOSIS — M25.561 CHRONIC PAIN OF RIGHT KNEE: ICD-10-CM

## 2023-07-24 DIAGNOSIS — Z12.5 SCREENING FOR PROSTATE CANCER: ICD-10-CM

## 2023-07-24 DIAGNOSIS — N18.31 CHRONIC KIDNEY DISEASE, STAGE 3A (H): ICD-10-CM

## 2023-07-24 DIAGNOSIS — R97.20 ELEVATED PROSTATE SPECIFIC ANTIGEN (PSA): ICD-10-CM

## 2023-07-24 DIAGNOSIS — Z00.00 ROUTINE GENERAL MEDICAL EXAMINATION AT A HEALTH CARE FACILITY: Primary | ICD-10-CM

## 2023-07-24 DIAGNOSIS — R73.09 ELEVATED HEMOGLOBIN A1C: ICD-10-CM

## 2023-07-24 DIAGNOSIS — Z11.4 SCREENING FOR HIV (HUMAN IMMUNODEFICIENCY VIRUS): ICD-10-CM

## 2023-07-24 LAB
ALBUMIN SERPL BCG-MCNC: 4.6 G/DL (ref 3.5–5.2)
ALBUMIN UR-MCNC: 100 MG/DL
ALP SERPL-CCNC: 81 U/L (ref 40–129)
ALT SERPL W P-5'-P-CCNC: 27 U/L (ref 0–70)
ANION GAP SERPL CALCULATED.3IONS-SCNC: 13 MMOL/L (ref 7–15)
APPEARANCE UR: CLEAR
AST SERPL W P-5'-P-CCNC: 25 U/L (ref 0–45)
BILIRUB SERPL-MCNC: 0.3 MG/DL
BILIRUB UR QL STRIP: NEGATIVE
BUN SERPL-MCNC: 17.5 MG/DL (ref 8–23)
CALCIUM SERPL-MCNC: 9.7 MG/DL (ref 8.8–10.2)
CHLORIDE SERPL-SCNC: 104 MMOL/L (ref 98–107)
CHOLEST SERPL-MCNC: 156 MG/DL
COLOR UR AUTO: YELLOW
CREAT SERPL-MCNC: 1.22 MG/DL (ref 0.67–1.17)
CREAT UR-MCNC: 262.5 MG/DL
DEPRECATED HCO3 PLAS-SCNC: 26 MMOL/L (ref 22–29)
GFR SERPL CREATININE-BSD FRML MDRD: 67 ML/MIN/1.73M2
GLUCOSE SERPL-MCNC: 104 MG/DL (ref 70–99)
GLUCOSE UR STRIP-MCNC: NEGATIVE MG/DL
HBA1C MFR BLD: 5.7 % (ref 0–5.6)
HDLC SERPL-MCNC: 46 MG/DL
HGB BLD-MCNC: 13 G/DL (ref 13.3–17.7)
HGB UR QL STRIP: ABNORMAL
HIV 1+2 AB+HIV1 P24 AG SERPL QL IA: NONREACTIVE
KETONES UR STRIP-MCNC: NEGATIVE MG/DL
LDLC SERPL CALC-MCNC: 71 MG/DL
LEUKOCYTE ESTERASE UR QL STRIP: NEGATIVE
MICROALBUMIN UR-MCNC: 165.6 MG/L
MICROALBUMIN/CREAT UR: 63.09 MG/G CR (ref 0–17)
NITRATE UR QL: NEGATIVE
NONHDLC SERPL-MCNC: 110 MG/DL
PH UR STRIP: 5 [PH] (ref 5–7)
POTASSIUM SERPL-SCNC: 3.5 MMOL/L (ref 3.4–5.3)
PROT SERPL-MCNC: 7.4 G/DL (ref 6.4–8.3)
PSA SERPL DL<=0.01 NG/ML-MCNC: 4.74 NG/ML (ref 0–4.5)
RBC #/AREA URNS AUTO: NORMAL /HPF
SODIUM SERPL-SCNC: 143 MMOL/L (ref 136–145)
SP GR UR STRIP: >=1.03 (ref 1–1.03)
TRIGL SERPL-MCNC: 194 MG/DL
UROBILINOGEN UR STRIP-ACNC: 0.2 E.U./DL
WBC #/AREA URNS AUTO: NORMAL /HPF

## 2023-07-24 PROCEDURE — 85018 HEMOGLOBIN: CPT

## 2023-07-24 PROCEDURE — 83036 HEMOGLOBIN GLYCOSYLATED A1C: CPT

## 2023-07-24 PROCEDURE — G0103 PSA SCREENING: HCPCS

## 2023-07-24 PROCEDURE — 83540 ASSAY OF IRON: CPT

## 2023-07-24 PROCEDURE — 99214 OFFICE O/P EST MOD 30 MIN: CPT | Mod: 25 | Performed by: FAMILY MEDICINE

## 2023-07-24 PROCEDURE — 80053 COMPREHEN METABOLIC PANEL: CPT

## 2023-07-24 PROCEDURE — 81001 URINALYSIS AUTO W/SCOPE: CPT

## 2023-07-24 PROCEDURE — 83550 IRON BINDING TEST: CPT

## 2023-07-24 PROCEDURE — 82728 ASSAY OF FERRITIN: CPT

## 2023-07-24 PROCEDURE — 73562 X-RAY EXAM OF KNEE 3: CPT | Mod: TC | Performed by: RADIOLOGY

## 2023-07-24 PROCEDURE — 82570 ASSAY OF URINE CREATININE: CPT

## 2023-07-24 PROCEDURE — 82043 UR ALBUMIN QUANTITATIVE: CPT

## 2023-07-24 PROCEDURE — 36415 COLL VENOUS BLD VENIPUNCTURE: CPT

## 2023-07-24 PROCEDURE — 99396 PREV VISIT EST AGE 40-64: CPT | Performed by: FAMILY MEDICINE

## 2023-07-24 PROCEDURE — 80061 LIPID PANEL: CPT

## 2023-07-24 PROCEDURE — 87389 HIV-1 AG W/HIV-1&-2 AB AG IA: CPT

## 2023-07-24 RX ORDER — AMLODIPINE BESYLATE 10 MG/1
10 TABLET ORAL DAILY
Qty: 90 TABLET | Refills: 3 | Status: SHIPPED | OUTPATIENT
Start: 2023-07-24 | End: 2024-08-21

## 2023-07-24 ASSESSMENT — ENCOUNTER SYMPTOMS
CONSTIPATION: 0
DIZZINESS: 0
CHILLS: 0
HEMATOCHEZIA: 0
NAUSEA: 0
PARESTHESIAS: 0
HEMATURIA: 0
FREQUENCY: 0
SHORTNESS OF BREATH: 0
NERVOUS/ANXIOUS: 0
SORE THROAT: 0
FEVER: 0
COUGH: 0
JOINT SWELLING: 0
EYE PAIN: 0
HEARTBURN: 0
PALPITATIONS: 0
HEADACHES: 0
MYALGIAS: 0
ARTHRALGIAS: 0
DIARRHEA: 0
ABDOMINAL PAIN: 0
WEAKNESS: 0
DYSURIA: 0

## 2023-07-24 NOTE — PATIENT INSTRUCTIONS
Shingles vaccine  Covid booster     Preventive Health Recommendations  Male Ages 50 - 64    Yearly exam:             See your health care provider every year in order to  o   Review health changes.   o   Discuss preventive care.    o   Review your medicines if your doctor has prescribed any.   Have a cholesterol test every 5 years, or more frequently if you are at risk for high cholesterol/heart disease.   Have a diabetes test (fasting glucose) every three years. If you are at risk for diabetes, you should have this test more often.   Have a colonoscopy at age 50, or have a yearly FIT test (stool test). These exams will check for colon cancer.    Talk with your health care provider about whether or not a prostate cancer screening test (PSA) is right for you.  You should be tested each year for STDs (sexually transmitted diseases), if you re at risk.     Shots: Get a flu shot each year. Get a tetanus shot every 10 years.     Nutrition:  Eat at least 5 servings of fruits and vegetables daily.   Eat whole-grain bread, whole-wheat pasta and brown rice instead of white grains and rice.   Get adequate Calcium and Vitamin D.     Lifestyle  Exercise for at least 150 minutes a week (30 minutes a day, 5 days a week). This will help you control your weight and prevent disease.   Limit alcohol to one drink per day.   No smoking.   Wear sunscreen to prevent skin cancer.   See your dentist every six months for an exam and cleaning.   See your eye doctor every 1 to 2 years.      Preventive Health Recommendations  Male Ages 50 - 64    Yearly exam:             See your health care provider every year in order to  o   Review health changes.   o   Discuss preventive care.    o   Review your medicines if your doctor has prescribed any.   Have a cholesterol test every 5 years, or more frequently if you are at risk for high cholesterol/heart disease.   Have a diabetes test (fasting glucose) every three years. If you are at risk for  diabetes, you should have this test more often.   Have a colonoscopy at age 50, or have a yearly FIT test (stool test). These exams will check for colon cancer.    Talk with your health care provider about whether or not a prostate cancer screening test (PSA) is right for you.  You should be tested each year for STDs (sexually transmitted diseases), if you re at risk.     Shots: Get a flu shot each year. Get a tetanus shot every 10 years.     Nutrition:  Eat at least 5 servings of fruits and vegetables daily.   Eat whole-grain bread, whole-wheat pasta and brown rice instead of white grains and rice.   Get adequate Calcium and Vitamin D.     Lifestyle  Exercise for at least 150 minutes a week (30 minutes a day, 5 days a week). This will help you control your weight and prevent disease.   Limit alcohol to one drink per day.   No smoking.   Wear sunscreen to prevent skin cancer.   See your dentist every six months for an exam and cleaning.   See your eye doctor every 1 to 2 years.

## 2023-07-24 NOTE — PROGRESS NOTES
SUBJECTIVE:   CC: Josiah is an 63 year old who presents for preventative health visit.       7/24/2023     7:25 AM   Additional Questions   Roomed by Consuelo CALDERON MA   Accompanied by Self     Healthy Habits:     Getting at least 3 servings of Calcium per day:  NO    Bi-annual eye exam:  NO    Dental care twice a year:  NO    Sleep apnea or symptoms of sleep apnea:  None    Diet:  Regular (no restrictions)    Frequency of exercise:  2-3 days/week    Duration of exercise:  15-30 minutes    Taking medications regularly:  Yes    Medication side effects:  None    Hx of HTN: on amlodipine 10 mg. Completed BP study through [employer]-was told his BP was different between left and right side.     CKD stage 3A    Right knee-intermittent sharp pain on medial side-will happen with laying, or with first few steps.    Sleep: can fall asleep,  Today's PHQ-2 Score:       7/24/2023     7:22 AM   PHQ-2 ( 1999 [employer])   Q1: Little interest or pleasure in doing things 0   Q2: Feeling down, depressed or hopeless 0   PHQ-2 Score 0   Q1: Little interest or pleasure in doing things Not at all   Q2: Feeling down, depressed or hopeless Not at all   PHQ-2 Score 0     Musculoskeletal problem/pain    Duration: Years  Description  Location: Right knee  Intensity:  moderate  Accompanying signs and symptoms: Pain on the inner part of his knee.  No pain with touching, but with movement  History  Previous similar problem: YES  Previous evaluation:  none  Precipitating or alleviating factors:  Trauma or overuse: no   Aggravating factors include: standing and walking  Therapies tried and outcome: nothing    Have you ever done Advance Care Planning? (For example, a Health Directive, POLST, or a discussion with a medical provider or your loved ones about your wishes): Yes, patient states has an Advance Care Planning document and will bring a copy to the clinic.    Social History     Tobacco Use    Smoking status: Never    Smokeless tobacco: Never   Substance  "Use Topics    Alcohol use: Yes           7/17/2023     8:08 AM   Alcohol Use   Prescreen: >3 drinks/day or >7 drinks/week? No       Last PSA:   Prostate Specific Antigen Screen   Date Value Ref Range Status   07/24/2023 4.74 (H) 0.00 - 4.50 ng/mL Final       Reviewed orders with patient. Reviewed health maintenance and updated orders accordingly - Yes  Lab work is in process    Reviewed and updated as needed this visit by clinical staff   Tobacco  Allergies  Meds   Med Hx  Surg Hx  Fam Hx          Reviewed and updated as needed this visit by Provider       Med Hx  Surg Hx  Fam Hx             Review of Systems   Constitutional:  Negative for chills and fever.   HENT:  Negative for congestion, ear pain, hearing loss and sore throat.    Eyes:  Negative for pain and visual disturbance.   Respiratory:  Negative for cough and shortness of breath.    Cardiovascular:  Negative for chest pain, palpitations and peripheral edema.   Gastrointestinal:  Negative for abdominal pain, constipation, diarrhea, heartburn, hematochezia and nausea.   Genitourinary:  Positive for impotence. Negative for dysuria, frequency, genital sores, hematuria, penile discharge and urgency.   Musculoskeletal:  Negative for arthralgias, joint swelling and myalgias.   Skin:  Negative for rash.   Neurological:  Negative for dizziness, weakness, headaches and paresthesias.   Psychiatric/Behavioral:  Negative for mood changes. The patient is not nervous/anxious.      OBJECTIVE:   /70 (BP Location: Left arm)   Pulse 78   Temp 97.7  F (36.5  C) (Tympanic)   Resp 14   Ht 1.745 m (5' 8.7\")   Wt 93 kg (205 lb)   SpO2 98%   BMI 30.54 kg/m      Physical Exam  Constitutional:       Appearance: Normal appearance.   HENT:      Right Ear: Tympanic membrane normal.      Left Ear: Tympanic membrane normal.   Eyes:      Conjunctiva/sclera: Conjunctivae normal.   Cardiovascular:      Rate and Rhythm: Normal rate and regular rhythm.      Pulses: " Normal pulses.   Pulmonary:      Effort: Pulmonary effort is normal.      Breath sounds: Normal breath sounds.   Abdominal:      General: Bowel sounds are normal.   Musculoskeletal:      Right lower leg: No edema.      Left lower leg: No edema.   Skin:     General: Skin is warm and dry.   Neurological:      Mental Status: He is alert.   Psychiatric:         Thought Content: Thought content normal.         Judgment: Judgment normal.       ASSESSMENT/PLAN:   Josiah was seen today for physical.    Diagnoses and all orders for this visit:    Routine general medical examination at a health care facility    Chronic kidney disease, stage 3a (H)  -     UA Macroscopic with reflex to Microscopic and Culture; Future  -     Albumin Random Urine Quantitative with Creat Ratio; Future  -     Hemoglobin; Future  -     Comprehensive metabolic panel (BMP + Alb, Alk Phos, ALT, AST, Total. Bili, TP); Future  -     Ferritin; Future  -     Iron and iron binding capacity; Future    Screening for hyperlipidemia  -     Lipid panel reflex to direct LDL Non-fasting; Future    HTN, goal below 140/90  -     amLODIPine (NORVASC) 10 MG tablet; Take 1 tablet (10 mg) by mouth daily    Elevated hemoglobin A1c  -     Hemoglobin A1c; Future    Screening for prostate cancer  -     PSA, screen; Future    Chronic pain of right knee  Obtain x-ray today.  Can consider working with sports medicine versus physical therapy in the future  -     XR Knee Right 3 Views; Future    Elevated prostate specific antigen (PSA)  -     Adult Urology  Referral; Future    Other orders  -     REVIEW OF HEALTH MAINTENANCE PROTOCOL ORDERS        Patient has been advised of split billing requirements and indicates understanding: Yes      COUNSELING:   Reviewed preventive health counseling, as reflected in patient instructions  Special attention given to:        Regular exercise       Healthy diet/nutrition       Vision screening       Alcohol Use        Colorectal  cancer screening       Prostate cancer screening        He reports that he has never smoked. He has never used smokeless tobacco.            KELSEA CALHOUN DO  [facility]

## 2023-07-25 LAB
FERRITIN SERPL-MCNC: 97 NG/ML (ref 31–409)
IRON BINDING CAPACITY (ROCHE): 327 UG/DL (ref 240–430)
IRON SATN MFR SERPL: 22 % (ref 15–46)
IRON SERPL-MCNC: 73 UG/DL (ref 61–157)

## 2023-10-17 DIAGNOSIS — I10 HTN, GOAL BELOW 140/90: ICD-10-CM

## 2023-10-17 RX ORDER — AMLODIPINE BESYLATE 10 MG/1
10 TABLET ORAL DAILY
Qty: 90 TABLET | Refills: 3 | OUTPATIENT
Start: 2023-10-17

## 2023-12-13 ENCOUNTER — OFFICE VISIT (OUTPATIENT)
Dept: OPTOMETRY | Facility: CLINIC | Age: 63
End: 2023-12-13
Payer: COMMERCIAL

## 2023-12-13 DIAGNOSIS — H52.4 MYOPIA WITH ASTIGMATISM AND PRESBYOPIA, BILATERAL: Primary | ICD-10-CM

## 2023-12-13 DIAGNOSIS — H52.13 MYOPIA WITH ASTIGMATISM AND PRESBYOPIA, BILATERAL: Primary | ICD-10-CM

## 2023-12-13 DIAGNOSIS — H52.203 MYOPIA WITH ASTIGMATISM AND PRESBYOPIA, BILATERAL: Primary | ICD-10-CM

## 2023-12-13 PROCEDURE — 92004 COMPRE OPH EXAM NEW PT 1/>: CPT | Performed by: OPTOMETRIST

## 2023-12-13 PROCEDURE — 92015 DETERMINE REFRACTIVE STATE: CPT | Performed by: OPTOMETRIST

## 2023-12-13 ASSESSMENT — VISUAL ACUITY
CORRECTION_TYPE: GLASSES
OS_SC: 20/20-2
OD_SC: 20/30
OS_CC+: -1
METHOD: SNELLEN - LINEAR
OS_CC: 20/20
OD_CC+: -1
OD_CC: 20/20
OS_SC: 20/80
OD_SC: 20/70

## 2023-12-13 ASSESSMENT — REFRACTION_WEARINGRX
OS_SPHERE: -2.75
SPECS_TYPE: SVL
OD_AXIS: 171
OS_AXIS: 010
OD_SPHERE: -2.75
OD_CYLINDER: +1.50
OS_CYLINDER: +1.00

## 2023-12-13 ASSESSMENT — CONF VISUAL FIELD
OS_SUPERIOR_NASAL_RESTRICTION: 0
OS_SUPERIOR_TEMPORAL_RESTRICTION: 0
OD_INFERIOR_TEMPORAL_RESTRICTION: 0
OS_NORMAL: 1
OD_NORMAL: 1
OD_INFERIOR_NASAL_RESTRICTION: 0
OS_INFERIOR_TEMPORAL_RESTRICTION: 0
OS_INFERIOR_NASAL_RESTRICTION: 0
OD_SUPERIOR_TEMPORAL_RESTRICTION: 0
METHOD: COUNTING FINGERS
OD_SUPERIOR_NASAL_RESTRICTION: 0

## 2023-12-13 ASSESSMENT — EXTERNAL EXAM - RIGHT EYE: OD_EXAM: NORMAL

## 2023-12-13 ASSESSMENT — REFRACTION_MANIFEST
OD_AXIS: 164
OS_AXIS: 011
OD_AXIS: 159
OD_SPHERE: -1.75
OD_CYLINDER: +1.00
OS_SPHERE: -3.00
OS_SPHERE: -2.75
METHOD_AUTOREFRACTION: 1
OS_CYLINDER: +1.75
OS_CYLINDER: +1.25
OD_SPHERE: -2.25
OD_ADD: +2.50
OS_AXIS: 176
OD_CYLINDER: +1.00

## 2023-12-13 ASSESSMENT — TONOMETRY
OD_IOP_MMHG: 16
IOP_METHOD: APPLANATION
OS_IOP_MMHG: 16

## 2023-12-13 ASSESSMENT — CUP TO DISC RATIO
OD_RATIO: 0.3
OS_RATIO: 0.2

## 2023-12-13 ASSESSMENT — SLIT LAMP EXAM - LIDS
COMMENTS: NORMAL
COMMENTS: NORMAL

## 2023-12-13 ASSESSMENT — EXTERNAL EXAM - LEFT EYE: OS_EXAM: NORMAL

## 2023-12-13 NOTE — PROGRESS NOTES
Chief Complaint   Patient presents with    Annual Eye Exam        Last Eye Exam: 2021  Dilated Previously: Yes, side effects of dilation explained today    What are you currently using to see?  Glasses - SV distance        Distance Vision Acuity: Noticed gradual change in both eyes in glasses     Near Vision Acuity: Satisfied with vision while reading and using computer unaided    Eye Comfort: good  Do you use eye drops? : No      Adrianne Ramirez - Optometric Assistant           Medical, surgical and family histories reviewed and updated 12/13/2023.       OBJECTIVE: See Ophthalmology exam    ASSESSMENT:    ICD-10-CM    1. Myopia with astigmatism and presbyopia, bilateral  H52.13 EYE EXAM (SIMPLE-NONBILLABLE)    H52.203 REFRACTION    H52.4         Minimal change   PLAN:     Velvet Pollard OD

## 2023-12-13 NOTE — LETTER
12/13/2023         RE: Josiah Wolf  [address]  [place] [address]        Dear Colleague,    [facility] Please see a copy of my visit note below.    Chief Complaint   Patient presents with     Annual Eye Exam        Last Eye Exam: 2021  Dilated Previously: Yes, side effects of dilation explained today    What are you currently using to see?  Glasses - SV distance        Distance Vision Acuity: Noticed gradual change in both eyes in glasses     Near Vision Acuity: Satisfied with vision while reading and using computer unaided    Eye Comfort: good  Do you use eye drops? : No      Adrianne Ramirez - Optometric Assistant           Medical, surgical and family histories reviewed and updated 12/13/2023.       OBJECTIVE: See Ophthalmology exam    ASSESSMENT:    ICD-10-CM    1. Myopia with astigmatism and presbyopia, bilateral  H52.13 EYE EXAM (SIMPLE-NONBILLABLE)    H52.203 REFRACTION    H52.4         Minimal change   PLAN:     Velvet Pollard OD     Again, thank you for allowing me to participate in the care of your patient.        Sincerely,        Velvet Pollard, OD

## 2024-05-03 NOTE — TELEPHONE ENCOUNTER
Patient reports that he tested positive for COVID this week.     Reports that his O2 level was 70% - 80% at rest.  Attempted to confirm if was the o2 or heart rate.      Talking in single words.  Able to hear him struggling to breath/breathing heavy.     Patient rechecked O2 and HR.     O2 - 77% HR - 86    Advised to call 9-1-1 per protocol.     Patient asked questions about what hospitals were near him.  Thinking he will be going to ED vs. Calling 9-1-1.     Debby Singer RN/[facility]    COVID 19 Nurse Triage Plan/Patient Instructions    Please be aware that novel coronavirus (COVID-19) may be circulating in the community. If you develop symptoms such as fever, cough, or SOB or if you have concerns about the presence of another infection including coronavirus (COVID-19), please contact your health care provider or visit [email or web address].     Disposition/Instructions    Call to EMS/911 recommended. Follow protocol based instructions.     Bring Your Own Device:  Please also bring your smart device(s) (smart phones, tablets, laptops) and their charging cables for your personal use and to communicate with your care team during your visit.    Thank you for taking steps to prevent the spread of this virus.  o Limit your contact with others.  o Wear a simple mask to cover your cough.  o Wash your hands well and often.    Resources    [facility]: About COVID-19: [email or web address]    CDC: What to Do If You're Sick: www.cdc.gov/coronavirus/2019-ncov/about/steps-when-sick.html    CDC: Ending Home Isolation: www.cdc.gov/coronavirus/2019-ncov/hcp/disposition-in-home-patients.html     CDC: Caring for Someone: www.cdc.gov/coronavirus/2019-ncov/if-you-are-sick/care-for-someone.html     [facility]: Interim Guidance for Hospital Discharge to Home: www.health.[facility].[place]/diseases/coronavirus/hcp/hospdischarge.pdf    [facility] clinical trials (COVID-19 research studies):  clinicalaffairs.[email or web address]-clinical-trials     Below are the COVID-19 hotlines at the [place] [facility] ([facility]). Interpreters are available.   o For health questions: Call 132-550-7384 or 1-933.876.2653 (7 a.m. to 7 p.m.)  o For questions about schools and childcare: Call 344-681-0186 or 1-446.268.1483 (7 a.m. to 7 p.m.)    Reason for Disposition    Severe difficulty breathing (e.g., struggling for each breath, speaks in single words)    Additional Information    Negative: [1] Breathing stopped AND [2] hasn't returned    Negative: Choking on something    Protocols used: BREATHING DIFFICULTY-A-AH       177.8

## 2024-08-18 DIAGNOSIS — I10 HTN, GOAL BELOW 140/90: ICD-10-CM

## 2024-08-21 RX ORDER — AMLODIPINE BESYLATE 10 MG/1
10 TABLET ORAL DAILY
Qty: 30 TABLET | Refills: 1 | Status: SHIPPED | OUTPATIENT
Start: 2024-08-21 | End: 2024-09-11

## 2024-08-25 ENCOUNTER — HEALTH MAINTENANCE LETTER (OUTPATIENT)
Age: 64
End: 2024-08-25

## 2024-09-11 ENCOUNTER — OFFICE VISIT (OUTPATIENT)
Dept: FAMILY MEDICINE | Facility: CLINIC | Age: 64
End: 2024-09-11
Payer: COMMERCIAL

## 2024-09-11 VITALS
DIASTOLIC BLOOD PRESSURE: 80 MMHG | HEART RATE: 81 BPM | RESPIRATION RATE: 18 BRPM | SYSTOLIC BLOOD PRESSURE: 132 MMHG | TEMPERATURE: 97.7 F | HEIGHT: 69 IN | WEIGHT: 213 LBS | BODY MASS INDEX: 31.55 KG/M2 | OXYGEN SATURATION: 98 %

## 2024-09-11 DIAGNOSIS — Z87.19 HISTORY OF FATTY INFILTRATION OF LIVER: ICD-10-CM

## 2024-09-11 DIAGNOSIS — N18.31 CHRONIC KIDNEY DISEASE, STAGE 3A (H): ICD-10-CM

## 2024-09-11 DIAGNOSIS — Z13.220 LIPID SCREENING: ICD-10-CM

## 2024-09-11 DIAGNOSIS — Z87.898 HISTORY OF PREDIABETES: ICD-10-CM

## 2024-09-11 DIAGNOSIS — R39.9 LOWER URINARY TRACT SYMPTOMS: ICD-10-CM

## 2024-09-11 DIAGNOSIS — R97.20 ELEVATED PROSTATE SPECIFIC ANTIGEN (PSA): ICD-10-CM

## 2024-09-11 DIAGNOSIS — D64.9 ANEMIA, UNSPECIFIED TYPE: ICD-10-CM

## 2024-09-11 DIAGNOSIS — I10 HTN, GOAL BELOW 140/90: ICD-10-CM

## 2024-09-11 DIAGNOSIS — Z00.00 ROUTINE GENERAL MEDICAL EXAMINATION AT A HEALTH CARE FACILITY: Primary | ICD-10-CM

## 2024-09-11 LAB
ALBUMIN SERPL BCG-MCNC: 4.7 G/DL (ref 3.5–5.2)
ALBUMIN UR-MCNC: 100 MG/DL
ALP SERPL-CCNC: 77 U/L (ref 40–150)
ALT SERPL W P-5'-P-CCNC: 34 U/L (ref 0–70)
ANION GAP SERPL CALCULATED.3IONS-SCNC: 11 MMOL/L (ref 7–15)
APPEARANCE UR: CLEAR
AST SERPL W P-5'-P-CCNC: 30 U/L (ref 0–45)
BACTERIA #/AREA URNS HPF: ABNORMAL /HPF
BASOPHILS # BLD AUTO: 0 10E3/UL (ref 0–0.2)
BASOPHILS NFR BLD AUTO: 0 %
BILIRUB SERPL-MCNC: 0.6 MG/DL
BILIRUB UR QL STRIP: NEGATIVE
BUN SERPL-MCNC: 18.8 MG/DL (ref 8–23)
CALCIUM SERPL-MCNC: 9.5 MG/DL (ref 8.8–10.4)
CHLORIDE SERPL-SCNC: 101 MMOL/L (ref 98–107)
CHOLEST SERPL-MCNC: 158 MG/DL
COLOR UR AUTO: YELLOW
CREAT SERPL-MCNC: 1.5 MG/DL (ref 0.67–1.17)
CREAT UR-MCNC: 173.6 MG/DL
EGFRCR SERPLBLD CKD-EPI 2021: 52 ML/MIN/1.73M2
EOSINOPHIL # BLD AUTO: 0.1 10E3/UL (ref 0–0.7)
EOSINOPHIL NFR BLD AUTO: 1 %
ERYTHROCYTE [DISTWIDTH] IN BLOOD BY AUTOMATED COUNT: 14.6 % (ref 10–15)
FASTING STATUS PATIENT QL REPORTED: YES
FASTING STATUS PATIENT QL REPORTED: YES
FERRITIN SERPL-MCNC: 61 NG/ML (ref 31–409)
GLUCOSE SERPL-MCNC: 119 MG/DL (ref 70–99)
GLUCOSE UR STRIP-MCNC: NEGATIVE MG/DL
HBA1C MFR BLD: 6 % (ref 0–5.6)
HCO3 SERPL-SCNC: 26 MMOL/L (ref 22–29)
HCT VFR BLD AUTO: 38.3 % (ref 40–53)
HDLC SERPL-MCNC: 48 MG/DL
HGB BLD-MCNC: 12.5 G/DL (ref 13.3–17.7)
HGB UR QL STRIP: NEGATIVE
IMM GRANULOCYTES # BLD: 0 10E3/UL
IMM GRANULOCYTES NFR BLD: 0 %
IRON BINDING CAPACITY (ROCHE): 337 UG/DL (ref 240–430)
IRON SATN MFR SERPL: 28 % (ref 15–46)
IRON SERPL-MCNC: 95 UG/DL (ref 61–157)
KETONES UR STRIP-MCNC: NEGATIVE MG/DL
LDLC SERPL CALC-MCNC: 88 MG/DL
LEUKOCYTE ESTERASE UR QL STRIP: NEGATIVE
LYMPHOCYTES # BLD AUTO: 1 10E3/UL (ref 0.8–5.3)
LYMPHOCYTES NFR BLD AUTO: 13 %
MCH RBC QN AUTO: 26.7 PG (ref 26.5–33)
MCHC RBC AUTO-ENTMCNC: 32.6 G/DL (ref 31.5–36.5)
MCV RBC AUTO: 82 FL (ref 78–100)
MICROALBUMIN UR-MCNC: 226.6 MG/L
MICROALBUMIN/CREAT UR: 130.53 MG/G CR (ref 0–17)
MONOCYTES # BLD AUTO: 0.6 10E3/UL (ref 0–1.3)
MONOCYTES NFR BLD AUTO: 7 %
MUCOUS THREADS #/AREA URNS LPF: PRESENT /LPF
NEUTROPHILS # BLD AUTO: 5.8 10E3/UL (ref 1.6–8.3)
NEUTROPHILS NFR BLD AUTO: 78 %
NITRATE UR QL: NEGATIVE
NONHDLC SERPL-MCNC: 110 MG/DL
PH UR STRIP: 6 [PH] (ref 5–7)
PLATELET # BLD AUTO: 215 10E3/UL (ref 150–450)
POTASSIUM SERPL-SCNC: 4.5 MMOL/L (ref 3.4–5.3)
PROT SERPL-MCNC: 7.7 G/DL (ref 6.4–8.3)
PSA SERPL DL<=0.01 NG/ML-MCNC: 2.02 NG/ML (ref 0–4.5)
RBC # BLD AUTO: 4.69 10E6/UL (ref 4.4–5.9)
RBC #/AREA URNS AUTO: ABNORMAL /HPF
RETICS # AUTO: 0.07 10E6/UL (ref 0.03–0.1)
RETICS/RBC NFR AUTO: 1.5 % (ref 0.5–2)
SODIUM SERPL-SCNC: 138 MMOL/L (ref 135–145)
SP GR UR STRIP: 1.02 (ref 1–1.03)
TRIGL SERPL-MCNC: 111 MG/DL
UROBILINOGEN UR STRIP-ACNC: 0.2 E.U./DL
WBC # BLD AUTO: 7.4 10E3/UL (ref 4–11)
WBC #/AREA URNS AUTO: ABNORMAL /HPF

## 2024-09-11 PROCEDURE — 83036 HEMOGLOBIN GLYCOSYLATED A1C: CPT | Performed by: FAMILY MEDICINE

## 2024-09-11 PROCEDURE — 36415 COLL VENOUS BLD VENIPUNCTURE: CPT | Performed by: FAMILY MEDICINE

## 2024-09-11 PROCEDURE — 83540 ASSAY OF IRON: CPT | Performed by: FAMILY MEDICINE

## 2024-09-11 PROCEDURE — 99396 PREV VISIT EST AGE 40-64: CPT | Performed by: FAMILY MEDICINE

## 2024-09-11 PROCEDURE — 80053 COMPREHEN METABOLIC PANEL: CPT | Performed by: FAMILY MEDICINE

## 2024-09-11 PROCEDURE — 85045 AUTOMATED RETICULOCYTE COUNT: CPT | Performed by: FAMILY MEDICINE

## 2024-09-11 PROCEDURE — 83550 IRON BINDING TEST: CPT | Performed by: FAMILY MEDICINE

## 2024-09-11 PROCEDURE — 82043 UR ALBUMIN QUANTITATIVE: CPT | Performed by: FAMILY MEDICINE

## 2024-09-11 PROCEDURE — 85025 COMPLETE CBC W/AUTO DIFF WBC: CPT | Performed by: FAMILY MEDICINE

## 2024-09-11 PROCEDURE — 99214 OFFICE O/P EST MOD 30 MIN: CPT | Mod: 25 | Performed by: FAMILY MEDICINE

## 2024-09-11 PROCEDURE — 82728 ASSAY OF FERRITIN: CPT | Performed by: FAMILY MEDICINE

## 2024-09-11 PROCEDURE — 80061 LIPID PANEL: CPT | Performed by: FAMILY MEDICINE

## 2024-09-11 PROCEDURE — 81001 URINALYSIS AUTO W/SCOPE: CPT | Performed by: FAMILY MEDICINE

## 2024-09-11 PROCEDURE — 99207 BLOOD MORPHOLOGY PATHOLOGIST REVIEW: CPT | Performed by: PATHOLOGY

## 2024-09-11 PROCEDURE — G0103 PSA SCREENING: HCPCS | Performed by: FAMILY MEDICINE

## 2024-09-11 PROCEDURE — 82570 ASSAY OF URINE CREATININE: CPT | Performed by: FAMILY MEDICINE

## 2024-09-11 RX ORDER — LISINOPRIL 2.5 MG/1
2.5 TABLET ORAL DAILY
Qty: 30 TABLET | Refills: 1 | Status: SHIPPED | OUTPATIENT
Start: 2024-09-11

## 2024-09-11 RX ORDER — AMLODIPINE BESYLATE 10 MG/1
10 TABLET ORAL DAILY
Qty: 90 TABLET | Refills: 3 | Status: SHIPPED | OUTPATIENT
Start: 2024-09-11

## 2024-09-11 RX ORDER — TAMSULOSIN HYDROCHLORIDE 0.4 MG/1
0.4 CAPSULE ORAL DAILY
Qty: 30 CAPSULE | Refills: 1 | Status: SHIPPED | OUTPATIENT
Start: 2024-09-11

## 2024-09-11 RX ORDER — LISINOPRIL 5 MG/1
5 TABLET ORAL DAILY
Qty: 30 TABLET | Refills: 1 | Status: SHIPPED | OUTPATIENT
Start: 2024-09-11 | End: 2024-09-11

## 2024-09-11 ASSESSMENT — PAIN SCALES - GENERAL: PAINLEVEL: NO PAIN (0)

## 2024-09-11 NOTE — PROGRESS NOTES
Preventive Care Visit  [facility]  KELSEA CALHOUN DO, Family Medicine  Sep 11, 2024      Assessment & Plan     Routine general medical examination at a health care facility    Chronic kidney disease, stage 3a (H)  Plan to update labs.  Has not seen nephrology  - Albumin Random Urine Quantitative with Creat Ratio  - UA with Microscopic reflex to Culture - lab collect  - Comprehensive metabolic panel (BMP + Alb, Alk Phos, ALT, AST, Total. Bili, TP)  - lisinopril (ZESTRIL) 2.5 MG tablet  Dispense: 30 tablet; Refill: 1  - Albumin Random Urine Quantitative with Creat Ratio  - UA with Microscopic reflex to Culture - lab collect  - Comprehensive metabolic panel (BMP + Alb, Alk Phos, ALT, AST, Total. Bili, TP)  - UA Microscopic with Reflex to Culture    HTN, goal below 140/90  Appears stable, with at some point on ACE inhibitor however was stopped.  Plan to restart low-dose to see if we can help get better kidney protection  - amLODIPine (NORVASC) 10 MG tablet  Dispense: 90 tablet; Refill: 3  - Comprehensive metabolic panel (BMP + Alb, Alk Phos, ALT, AST, Total. Bili, TP)  - lisinopril (ZESTRIL) 2.5 MG tablet  Dispense: 30 tablet; Refill: 1  - Comprehensive metabolic panel (BMP + Alb, Alk Phos, ALT, AST, Total. Bili, TP)    Elevated prostate specific antigen (PSA)  - PSA, screen  - PSA, screen    Anemia, unspecified type  Very mild anemia, unclear cause?  Possibly related to his kidney disease.  Cologuard normal.  He does donate blood quite regularly every 4 months  - CBC with platelets and differential  - Iron and iron binding capacity  - Ferritin  - Lab Blood Morphology Pathologist Review  - Iron and iron binding capacity  - Ferritin  - Lab Blood Morphology Pathologist Review    History of prediabetes  - Hemoglobin A1c  - Hemoglobin A1c    History of fatty infiltration of liver  Discussed lifestyle changes  - Comprehensive metabolic panel (BMP + Alb, Alk Phos, ALT, AST, Total. Bili, TP)  -  "Comprehensive metabolic panel (BMP + Alb, Alk Phos, ALT, AST, Total. Bili, TP)    Lower urinary tract symptoms  Trial of low-dose Flomax  - tamsulosin (FLOMAX) 0.4 MG capsule  Dispense: 30 capsule; Refill: 1    Lipid screening  - Lipid panel reflex to direct LDL Fasting  - Lipid panel reflex to direct LDL Fasting              BMI  Estimated body mass index is 31.73 kg/m  as calculated from the following:    Height as of this encounter: 1.745 m (5' 8.7\").    Weight as of this encounter: 96.6 kg (213 lb).   Weight management plan: Discussed healthy diet and exercise guidelines    Counseling  Appropriate preventive services were addressed with this patient via screening, questionnaire, or discussion as appropriate for fall prevention, nutrition, physical activity, Tobacco-use cessation, social engagement, weight loss and cognition.  Checklist reviewing preventive services available has been given to the patient.  Reviewed patient's diet, addressing concerns and/or questions.   The patient was instructed to see the dentist every 6 months.   He is at risk for psychosocial distress and has been provided with information to reduce risk.         Oneyda Rahman is a 64 year old, presenting for the following:  Physical        9/11/2024     7:48 AM   Additional Questions   Roomed by Consuelo CALDERON MA   Accompanied by Self        Health Care Directive  Patient does not have a Health Care Directive or Living Will: Discussed advance care planning with patient; information given to patient to review.    HPI    PT regularily donates blood  Hypertension Follow-up    Do you check your blood pressure regularly outside of the clinic? No   Are you following a low salt diet? Yes  Are your blood pressures ever more than 140 on the top number (systolic) OR more   than 90 on the bottom number (diastolic), for example 140/90? Unknown due to not checking        9/11/2024   General Health   How would you rate your overall physical health? Good "   Feel stress (tense, anxious, or unable to sleep) Only a little      (!) STRESS CONCERN      9/11/2024   Nutrition   Three or more servings of calcium each day? (!) I DON'T KNOW   Diet: Regular (no restrictions)   How many servings of fruit and vegetables per day? (!) 2-3   How many sweetened beverages each day? 0-1            9/11/2024   Exercise   Days per week of moderate/strenous exercise 0 days      (!) EXERCISE CONCERN      9/11/2024   Social Factors   Frequency of gathering with friends or relatives Three times a week   Worry food won't last until get money to buy more No   Food not last or not have enough money for food? No   Do you have housing? (Housing is defined as stable permanent housing and does not include staying ouside in a car, in a tent, in an abandoned building, in an overnight shelter, or couch-surfing.) Yes   Are you worried about losing your housing? No   Lack of transportation? No   Unable to get utilities (heat,electricity)? No            9/11/2024   Fall Risk   Fallen 2 or more times in the past year? No   Trouble with walking or balance? No             9/11/2024   Dental   Dentist two times every year? (!) NO            9/11/2024   TB Screening   Were you born outside of the [place]? No        Today's PHQ-2 Score:       9/11/2024     7:42 AM   PHQ-2 ( 1999 [employer])   Q1: Little interest or pleasure in doing things 0   Q2: Feeling down, depressed or hopeless 0   PHQ-2 Score 0   Q1: Little interest or pleasure in doing things Not at all   Q2: Feeling down, depressed or hopeless Not at all   PHQ-2 Score 0           9/11/2024   Substance Use   Alcohol more than 3/day or more than 7/wk No   Do you use any other substances recreationally? No      Social History     Tobacco Use    Smoking status: Never     Passive exposure: Never    Smokeless tobacco: Never   Vaping Use    Vaping status: Never Used   Substance Use Topics    Alcohol use: Yes    Drug use: Never         9/11/2024   STI Screening   New  "sexual partner(s) since last STI/HIV test? No      Last PSA:   Prostate Specific Antigen Screen   Date Value Ref Range Status   09/11/2024 2.02 0.00 - 4.50 ng/mL Final     ASCVD Risk   The 10-year ASCVD risk score (Wing PATEL, et al., 2019) is: 10.5%    Values used to calculate the score:      Age: 64 years      Sex: Male      Is Non- : No      Diabetic: No      Tobacco smoker: No      Systolic Blood Pressure: 132 mmHg      Is BP treated: Yes      HDL Cholesterol: 48 mg/dL      Total Cholesterol: 158 mg/dL    Reviewed and updated as needed this visit by Provider                    Past Medical History:   Diagnosis Date    Hypertension      Past Surgical History:   Procedure Laterality Date    KNEE SURGERY Right     Mensicus repair       Review of Systems  Constitutional, HEENT, cardiovascular, pulmonary, gi and gu systems are negative, except as otherwise noted.     Objective    Exam  /80 (BP Location: Right arm, Patient Position: Chair, Cuff Size: Adult Regular)   Pulse 81   Temp 97.7  F (36.5  C)   Resp 18   Ht 1.745 m (5' 8.7\")   Wt 96.6 kg (213 lb)   SpO2 98%   BMI 31.73 kg/m     Estimated body mass index is 31.73 kg/m  as calculated from the following:    Height as of this encounter: 1.745 m (5' 8.7\").    Weight as of this encounter: 96.6 kg (213 lb).    Physical Exam  Constitutional:       Appearance: Normal appearance.   HENT:      Head: Normocephalic.      Right Ear: Tympanic membrane normal.      Left Ear: Tympanic membrane normal.   Eyes:      Conjunctiva/sclera: Conjunctivae normal.   Cardiovascular:      Rate and Rhythm: Normal rate and regular rhythm.   Pulmonary:      Effort: Pulmonary effort is normal.      Breath sounds: Normal breath sounds.   Abdominal:      General: Bowel sounds are normal.   Musculoskeletal:      Right lower leg: No edema.      Left lower leg: No edema.   Skin:     General: Skin is warm and dry.   Neurological:      General: No focal " deficit present.      Mental Status: He is alert.   Psychiatric:         Thought Content: Thought content normal.         Judgment: Judgment normal.         Signed Electronically by: KELSEA CALHOUN DO

## 2024-09-11 NOTE — PATIENT INSTRUCTIONS
Patient Education   Preventive Care Advice   This is general advice given by our system to help you stay healthy. However, your care team may have specific advice just for you. Please talk to your care team about your preventive care needs.  Nutrition  Eat 5 or more servings of fruits and vegetables each day.  Try wheat bread, brown rice and whole grain pasta (instead of white bread, rice, and pasta).  Get enough calcium and vitamin D. Check the label on foods and aim for 100% of the RDA (recommended daily allowance).  Lifestyle  Exercise at least 150 minutes each week  (30 minutes a day, 5 days a week).  Do muscle strengthening activities 2 days a week. These help control your weight and prevent disease.  No smoking.  Wear sunscreen to prevent skin cancer.  Have a dental exam and cleaning every 6 months.  Yearly exams  See your health care team every year to talk about:  Any changes in your health.  Any medicines your care team has prescribed.  Preventive care, family planning, and ways to prevent chronic diseases.  Shots (vaccines)   HPV shots (up to age 26), if you've never had them before.  Hepatitis B shots (up to age 59), if you've never had them before.  COVID-19 shot: Get this shot when it's due.  Flu shot: Get a flu shot every year.  Tetanus shot: Get a tetanus shot every 10 years.  Pneumococcal, hepatitis A, and RSV shots: Ask your care team if you need these based on your risk.  Shingles shot (for age 50 and up)  General health tests  Diabetes screening:  Starting at age 35, Get screened for diabetes at least every 3 years.  If you are younger than age 35, ask your care team if you should be screened for diabetes.  Cholesterol test: At age 39, start having a cholesterol test every 5 years, or more often if advised.  Bone density scan (DEXA): At age 50, ask your care team if you should have this scan for osteoporosis (brittle bones).  Hepatitis C: Get tested at least once in your life.  STIs (sexually  transmitted infections)  Before age 24: Ask your care team if you should be screened for STIs.  After age 24: Get screened for STIs if you're at risk. You are at risk for STIs (including HIV) if:  You are sexually active with more than one person.  You don't use condoms every time.  You or a partner was diagnosed with a sexually transmitted infection.  If you are at risk for HIV, ask about PrEP medicine to prevent HIV.  Get tested for HIV at least once in your life, whether you are at risk for HIV or not.  Cancer screening tests  Cervical cancer screening: If you have a cervix, begin getting regular cervical cancer screening tests starting at age 21.  Breast cancer scan (mammogram): If you've ever had breasts, begin having regular mammograms starting at age 40. This is a scan to check for breast cancer.  Colon cancer screening: It is important to start screening for colon cancer at age 45.  Have a colonoscopy test every 10 years (or more often if you're at risk) Or, ask your provider about stool tests like a FIT test every year or Cologuard test every 3 years.  To learn more about your testing options, visit:   .  For help making a decision, visit:   https://bit.ly/ih64346.  Prostate cancer screening test: If you have a prostate, ask your care team if a prostate cancer screening test (PSA) at age 55 is right for you.  Lung cancer screening: If you are a current or former smoker ages 50 to 80, ask your care team if ongoing lung cancer screenings are right for you.  For informational purposes only. Not to replace the advice of your health care provider. Copyright   2023 [employer]. All rights reserved. Clinically reviewed by the [facility] Transitions Program. [employer] 531651 - REV 01/24.

## 2024-09-12 LAB
PATH REPORT.COMMENTS IMP SPEC: NORMAL
PATH REPORT.COMMENTS IMP SPEC: NORMAL
PATH REPORT.FINAL DX SPEC: NORMAL
PATH REPORT.MICROSCOPIC SPEC OTHER STN: NORMAL
PATH REPORT.MICROSCOPIC SPEC OTHER STN: NORMAL
PATH REPORT.RELEVANT HX SPEC: NORMAL

## 2024-11-08 ENCOUNTER — DOCUMENTATION ONLY (OUTPATIENT)
Dept: MULTI SPECIALTY CLINIC | Facility: CLINIC | Age: 64
End: 2024-11-08

## 2024-11-08 NOTE — PROGRESS NOTES
Josiah Wolf has an upcoming lab appointment:    Future Appointments   Date Time Provider Department Center   11/13/2024  8:00 AM LAB FIRST FLOOR [facility] [place] [place]   11/18/2024  8:30 AM At[place] Rice [place] [facility]     Patient is scheduled for the following lab(s): Pt is requesting labs for 11/13/24 from [facility] with no labs in chart.    There is no order available. Please review and place either future orders or HMPO (Review of Health Maintenance Protocol Orders), as appropriate.    Health Maintenance Due   Topic    ANNUAL REVIEW OF HM ORDERS      Thank you, Traci Trejo

## 2024-11-11 ENCOUNTER — DOCUMENTATION ONLY (OUTPATIENT)
Dept: MULTI SPECIALTY CLINIC | Facility: CLINIC | Age: 64
End: 2024-11-11
Payer: COMMERCIAL

## 2024-11-11 DIAGNOSIS — N18.31 CHRONIC KIDNEY DISEASE, STAGE 3A (H): Primary | ICD-10-CM

## 2024-11-11 NOTE — PROGRESS NOTES
Josiah Wolf has an upcoming lab appointment:    Future Appointments   Date Time Provider Department Center   11/13/2024  8:00 AM LAB FIRST FLOOR [facility] [place]   11/18/2024  8:30 AM [place] [facility] [place]     Patient is scheduled for the following lab(s): Pt is requesting labs for 11/13/24 from [facility] with no orders in chart.     There is no order available. Please review and place either future orders or HMPO (Review of Health Maintenance Protocol Orders), as appropriate.    Health Maintenance Due   Topic    ANNUAL REVIEW OF HM ORDERS      Thank you, Traci Trejo

## 2024-11-12 NOTE — PROGRESS NOTES
Renal panel, UA, urine protein, PTH, hemoglobin, iron studies. Serum immunofixation, SPEP, kappa/lambda ratio. Thank you, KW

## 2024-12-03 NOTE — TELEPHONE ENCOUNTER
RECORDS RECEIVED FROM: Internal   Appt Date: 12.03.2021   NOTES STATUS DETAILS   OFFICE NOTE from referring provider Internal 11.29.2021 Leta Miles APRN CNP   OFFICE NOTES from other specialists N/A    DISCHARGE SUMMARY from hospital N/A    MEDICATION LIST Internal / CE    LIVER BIOSPY (IF APPLICABLE)      PATHOLOGY REPORTS  N/A    IMAGING     ENDOSCOPY (IF AVAILABLE) N/A    COLONOSCOPY (IF AVAILABLE) N/A    ULTRASOUND LIVER Internal 11.24.2021 US Renal Complete   CT OF ABDOMEN N/A    MRI OF LIVER N/A    FIBROSCAN, US ELASTOGRAPHY, FIBROSIS SCAN, MR ELASTOGRAPHY N/A    LABS     HEPATIC PANEL (LIVER PANEL) Internal 12.01.2021   BASIC METABOLIC PANEL Internal 11.18.2021   COMPLETE METABOLIC PANEL N/A    COMPLETE BLOOD COUNT (CBC) N/A    INTERNATIONAL NORMALIZED RATIO (INR) N/A    HEPATITIS C ANTIBODY N/A    HEPATITIS C VIRAL LOAD/PCR N/A    HEPATITIS C GENOTYPE N/A    HEPATITIS B SURFACE ANTIGEN N/A    HEPATITIS B SURFACE ANTIBODY N/A    HEPATITIS B DNA QUANT LEVEL N/A    HEPATITIS B CORE ANTIBODY N/A        
Opt out